# Patient Record
Sex: MALE | URBAN - METROPOLITAN AREA
[De-identification: names, ages, dates, MRNs, and addresses within clinical notes are randomized per-mention and may not be internally consistent; named-entity substitution may affect disease eponyms.]

---

## 2017-05-04 ENCOUNTER — TRANSFERRED RECORDS (OUTPATIENT)
Dept: HEALTH INFORMATION MANAGEMENT | Facility: CLINIC | Age: 67
End: 2017-05-04

## 2017-05-08 ENCOUNTER — TRANSFERRED RECORDS (OUTPATIENT)
Dept: HEALTH INFORMATION MANAGEMENT | Facility: CLINIC | Age: 67
End: 2017-05-08

## 2017-05-10 ENCOUNTER — TRANSFERRED RECORDS (OUTPATIENT)
Dept: HEALTH INFORMATION MANAGEMENT | Facility: CLINIC | Age: 67
End: 2017-05-10

## 2017-05-15 ENCOUNTER — TRANSFERRED RECORDS (OUTPATIENT)
Dept: HEALTH INFORMATION MANAGEMENT | Facility: CLINIC | Age: 67
End: 2017-05-15

## 2017-05-18 ENCOUNTER — TRANSFERRED RECORDS (OUTPATIENT)
Dept: HEALTH INFORMATION MANAGEMENT | Facility: CLINIC | Age: 67
End: 2017-05-18

## 2017-05-22 ENCOUNTER — MEDICAL CORRESPONDENCE (OUTPATIENT)
Dept: HEALTH INFORMATION MANAGEMENT | Facility: CLINIC | Age: 67
End: 2017-05-22

## 2017-06-05 ENCOUNTER — PRE VISIT (OUTPATIENT)
Dept: UROLOGY | Facility: CLINIC | Age: 67
End: 2017-06-05

## 2017-06-09 ENCOUNTER — OFFICE VISIT (OUTPATIENT)
Dept: UROLOGY | Facility: CLINIC | Age: 67
End: 2017-06-09

## 2017-06-09 ENCOUNTER — OFFICE VISIT (OUTPATIENT)
Dept: SURGERY | Facility: CLINIC | Age: 67
End: 2017-06-09

## 2017-06-09 ENCOUNTER — ALLIED HEALTH/NURSE VISIT (OUTPATIENT)
Dept: UROLOGY | Facility: CLINIC | Age: 67
End: 2017-06-09

## 2017-06-09 ENCOUNTER — ALLIED HEALTH/NURSE VISIT (OUTPATIENT)
Dept: SURGERY | Facility: CLINIC | Age: 67
End: 2017-06-09

## 2017-06-09 ENCOUNTER — ANESTHESIA EVENT (OUTPATIENT)
Dept: SURGERY | Facility: CLINIC | Age: 67
DRG: 658 | End: 2017-06-09
Payer: MEDICARE

## 2017-06-09 VITALS
BODY MASS INDEX: 23.35 KG/M2 | DIASTOLIC BLOOD PRESSURE: 90 MMHG | SYSTOLIC BLOOD PRESSURE: 161 MMHG | OXYGEN SATURATION: 98 % | HEART RATE: 74 BPM | RESPIRATION RATE: 16 BRPM | HEIGHT: 72 IN | WEIGHT: 172.4 LBS | TEMPERATURE: 97.4 F

## 2017-06-09 VITALS
HEIGHT: 71 IN | HEART RATE: 60 BPM | SYSTOLIC BLOOD PRESSURE: 165 MMHG | DIASTOLIC BLOOD PRESSURE: 87 MMHG | BODY MASS INDEX: 24.14 KG/M2 | WEIGHT: 172.4 LBS

## 2017-06-09 DIAGNOSIS — C64.1 MALIGNANT NEOPLASM OF RIGHT KIDNEY (H): Primary | ICD-10-CM

## 2017-06-09 DIAGNOSIS — Z01.818 PRE-OP EVALUATION: Primary | ICD-10-CM

## 2017-06-09 DIAGNOSIS — I10 ESSENTIAL HYPERTENSION: ICD-10-CM

## 2017-06-09 DIAGNOSIS — N28.89 RENAL MASS: ICD-10-CM

## 2017-06-09 PROBLEM — R31.0 GROSS HEMATURIA: Status: ACTIVE | Noted: 2017-05-03

## 2017-06-09 LAB
ALBUMIN SERPL-MCNC: 4.3 G/DL (ref 3.4–5)
ALBUMIN UR-MCNC: NEGATIVE MG/DL
ALP SERPL-CCNC: 74 U/L (ref 40–150)
ALT SERPL W P-5'-P-CCNC: 32 U/L (ref 0–70)
ANION GAP SERPL CALCULATED.3IONS-SCNC: 8 MMOL/L (ref 3–14)
APPEARANCE UR: CLEAR
AST SERPL W P-5'-P-CCNC: 21 U/L (ref 0–45)
BASOPHILS # BLD AUTO: 0.1 10E9/L (ref 0–0.2)
BASOPHILS NFR BLD AUTO: 1 %
BILIRUB SERPL-MCNC: 0.8 MG/DL (ref 0.2–1.3)
BILIRUB UR QL STRIP: NEGATIVE
BUN SERPL-MCNC: 23 MG/DL (ref 7–30)
CALCIUM SERPL-MCNC: 9.5 MG/DL (ref 8.5–10.1)
CHLORIDE SERPL-SCNC: 106 MMOL/L (ref 94–109)
CO2 SERPL-SCNC: 26 MMOL/L (ref 20–32)
COLOR UR AUTO: YELLOW
CREAT SERPL-MCNC: 0.98 MG/DL (ref 0.66–1.25)
DIFFERENTIAL METHOD BLD: NORMAL
EOSINOPHIL # BLD AUTO: 0 10E9/L (ref 0–0.7)
EOSINOPHIL NFR BLD AUTO: 0.3 %
ERYTHROCYTE [DISTWIDTH] IN BLOOD BY AUTOMATED COUNT: 12.6 % (ref 10–15)
GFR SERPL CREATININE-BSD FRML MDRD: 76 ML/MIN/1.7M2
GLUCOSE SERPL-MCNC: 118 MG/DL (ref 70–99)
GLUCOSE UR STRIP-MCNC: NEGATIVE MG/DL
HCT VFR BLD AUTO: 45.7 % (ref 40–53)
HGB BLD-MCNC: 15.9 G/DL (ref 13.3–17.7)
HGB UR QL STRIP: NEGATIVE
IMM GRANULOCYTES # BLD: 0 10E9/L (ref 0–0.4)
IMM GRANULOCYTES NFR BLD: 0.5 %
INR PPP: 1.04 (ref 0.86–1.14)
KETONES UR STRIP-MCNC: NEGATIVE MG/DL
LEUKOCYTE ESTERASE UR QL STRIP: NEGATIVE
LYMPHOCYTES # BLD AUTO: 0.9 10E9/L (ref 0.8–5.3)
LYMPHOCYTES NFR BLD AUTO: 15.1 %
MCH RBC QN AUTO: 31.5 PG (ref 26.5–33)
MCHC RBC AUTO-ENTMCNC: 34.8 G/DL (ref 31.5–36.5)
MCV RBC AUTO: 91 FL (ref 78–100)
MONOCYTES # BLD AUTO: 0.4 10E9/L (ref 0–1.3)
MONOCYTES NFR BLD AUTO: 7 %
MUCOUS THREADS #/AREA URNS LPF: PRESENT /LPF
NEUTROPHILS # BLD AUTO: 4.4 10E9/L (ref 1.6–8.3)
NEUTROPHILS NFR BLD AUTO: 76.1 %
NITRATE UR QL: NEGATIVE
NRBC # BLD AUTO: 0 10*3/UL
NRBC BLD AUTO-RTO: 0 /100
PH UR STRIP: 6 PH (ref 5–7)
PLATELET # BLD AUTO: 183 10E9/L (ref 150–450)
POTASSIUM SERPL-SCNC: 4.2 MMOL/L (ref 3.4–5.3)
PROT SERPL-MCNC: 7.3 G/DL (ref 6.8–8.8)
RBC # BLD AUTO: 5.05 10E12/L (ref 4.4–5.9)
RBC #/AREA URNS AUTO: 1 /HPF (ref 0–2)
SODIUM SERPL-SCNC: 140 MMOL/L (ref 133–144)
SP GR UR STRIP: 1.02 (ref 1–1.03)
URN SPEC COLLECT METH UR: ABNORMAL
UROBILINOGEN UR STRIP-MCNC: 0 MG/DL (ref 0–2)
WBC # BLD AUTO: 5.8 10E9/L (ref 4–11)
WBC #/AREA URNS AUTO: 1 /HPF (ref 0–2)

## 2017-06-09 RX ORDER — AMLODIPINE BESYLATE 10 MG/1
10 TABLET ORAL EVERY MORNING
COMMUNITY
Start: 2017-04-25

## 2017-06-09 RX ORDER — CEFAZOLIN SODIUM 1 G/3ML
1 INJECTION, POWDER, FOR SOLUTION INTRAMUSCULAR; INTRAVENOUS SEE ADMIN INSTRUCTIONS
Status: CANCELLED | OUTPATIENT
Start: 2017-06-09

## 2017-06-09 RX ORDER — GABAPENTIN 300 MG/1
300 CAPSULE ORAL ONCE
Status: CANCELLED | OUTPATIENT
Start: 2017-06-09 | End: 2017-06-09

## 2017-06-09 RX ORDER — ACETAMINOPHEN 325 MG/1
975 TABLET ORAL ONCE
Status: CANCELLED | OUTPATIENT
Start: 2017-06-09 | End: 2017-06-09

## 2017-06-09 RX ORDER — HEPARIN SODIUM 5000 [USP'U]/.5ML
5000 INJECTION, SOLUTION INTRAVENOUS; SUBCUTANEOUS
Status: CANCELLED | OUTPATIENT
Start: 2017-06-09

## 2017-06-09 ASSESSMENT — ENCOUNTER SYMPTOMS
DIFFICULTY URINATING: 0
POLYPHAGIA: 0
WEIGHT LOSS: 0
WEIGHT GAIN: 0
CHILLS: 0
NIGHT SWEATS: 1
DYSURIA: 0
FLANK PAIN: 0
FEVER: 0
INCREASED ENERGY: 0
HALLUCINATIONS: 0
HEMATURIA: 1
FATIGUE: 1
POLYDIPSIA: 0
DECREASED APPETITE: 0
ALTERED TEMPERATURE REGULATION: 0

## 2017-06-09 ASSESSMENT — LIFESTYLE VARIABLES: TOBACCO_USE: 1

## 2017-06-09 ASSESSMENT — PAIN SCALES - GENERAL: PAINLEVEL: NO PAIN (0)

## 2017-06-09 NOTE — MR AVS SNAPSHOT
After Visit Summary   2017    Davon Hui    MRN: [id number]           Patient Information     Date Of Birth          1950        Visit Information        Provider Department      2017 12:30 PM Rn, [facility]        Care Instructions    Preparing for Your Surgery      Name:  Davon Hui   MRN:  [id number]   :  1950   Today's Date:  2017     Arriving for surgery:  Surgery date:    Surgery time:  9:30AM  Arrival time:  7:30AM  Please come to:       [facility] [address]  [address] [place]  [address]    -   parking is available in front of the hospital from 5:15 am to 8:00 pm    -  Stop at the Information Desk in the lobby    -   Inform the information person that you are here for surgery. An escort to 3c will be provided. If you would not like an escort, please proceed to 3C on the 3rd floor. [phone]     What can I eat or drink?  -  You may have solid food or milk products until 8 hours prior to your surgery--do not eat food after midnight on the night before surgery   -  You may have water, apple juice or 7up/Sprite until 2 hours prior to your surgery--OK to have CLEAR liquids until 7:30AM on the morning of surgery     Which medicines can I take?  -  Do NOT take these medications in the morning, the day of surgery:      -  Please take these medications the day of surgery:    Amlodipine     How do I prepare myself?  -  Take two showers: one the night before surgery; and one the morning of surgery.         Use Scrubcare or Hibiclens to wash from neck down.  You may use your own shampoo and conditioner. No other hair products.   -  Do NOT use lotion, powder, deodorant, or antiperspirant the day of your surgery.  -  Do NOT wear any makeup, fingernail polish or jewelry.  -  Begin using Incentive Spirometer 1 week prior to surgery.  Use 4 times per day, up to 5-10 breaths each time.   Bring Incentive Spirometer to hospital.  -Do not bring your own medications to the hospital, except for inhalers and eye drops.  -  Bring your ID and insurance card.    Questions or Concerns:  If you have questions or concerns, please call the  Preoperative Assessment Center, Monday-Friday 7AM-7PM:  [phone]            AFTER YOUR SURGERY  Breathing exercises   Breathing exercises help you recover faster. Take deep breaths and let the air out slowly. This will:     Help you wake up after surgery.    Help prevent complications like pneumonia.  Preventing complications will help you go home sooner.   We may give you a breathing device (incentive spirometer) to encourage you to breathe deeply.   Nausea and vomiting   You may feel sick to your stomach after surgery; if so, let your nurse know.    Pain control:  After surgery, you may have pain. Our goal is to help you manage your pain. Pain medicine will help you feel comfortable enough to do activities that will help you heal.  These activities may include breathing exercises, walking and physical therapy.   To help your health care team treat your pain we will ask: 1) If you have pain  2) where it is located 3) describe your pain in your words  Methods of pain control include medications given by mouth, vein or by nerve block for some surgeries.  We may give you a pain control pump that will:  1) Deliver the medicine through a tube placed in your vein  2) Control the amount of medicine you receive  3) Allow you to push a button to deliver a dose of pain medicine  Sequential Compression Device (SCD) or Pneumo Boots:  You may need to wear SCD S on your legs or feet. These are wraps connected to a machine that pumps in air and releases it. The repeated pumping helps prevent blood clots from forming.    Using an Incentive Spirometer  Soon after your surgery, a nurse or therapist will teach you breathing exercises. These keep your lungs clear, strengthen your breathing  muscles, and help prevent complications.  The exercises include doing a deep-breathing exercise using a device called an incentive spirometer.  To do these exercises, you will breathe in through your mouth and not your nose. The incentive spirometer only works correctly if you breathe in through your mouth.  Four steps to clear lungs     Deep breathing expands the lungs, aids circulation, and helps prevent pneumonia.   1. Exhale normally.    Relax and breathe out.  2. Place your lips tightly around the mouthpiece.    Make sure the device is upright and not tilted.  3. Inhale as much air as you can through the mouthpiece (don't breath through your nose).    Inhale slowly and deeply.    Hold your breath long enough to keep the balls or disk raised for at least 3 seconds.    If you re inhaling too quickly, your device may make a tone. If you hear this tone, inhale more slowly.  4. Repeat the exercise regularly.    Do this exercise every hour while you're awake, or as your health care provider instructs.    You will also be taught coughing exercises and be asked to do them regularly on your own.    1003-9331 [employer]. [address] [place] [address]. All rights reserved. This information is not intended as a substitute for professional medical care. Always follow your healthcare professional's instructions.                Follow-ups after your visit        Your next 10 appointments already scheduled     Jun 09, 2017 12:30 PM CDT   (Arrive by 12:15 PM)   PAC RN ASSESSMENT with Courtney Pac Rn   [facility] Preoperative Assessment [place] ([facility])    [address] [place] [address] [phone]            Jun 09, 2017  1:10 PM CDT   (Arrive by 12:55 PM)   PAC Anesthesia Consult with Courtney Pac Anesthesiologist   [facility] Preoperative Assessment [place] ([facility])    [address] [place] [address]    [phone]            Jun 16, 2017  1:45 PM CDT   (Arrive by 1:30 PM)   MR ABDOMEN W/O & W CONTRAST ANGIOGRAM with UCMR1   [facility] [place] [facility] ([facility] [place])    [address] [id number] [phone]           Take your medicines as usual, unless your doctor tells you not to. Bring a list of your current medicines to your exam (including vitamins, minerals and over-the-counter drugs). Also bring the results of similar scans you may have had.    The day before your exam, drink extra fluids at least six 8-ounce glasses (unless your doctor tells you to restrict your fluids).   Have a blood test (creatinine test) within 30 days of your exam. Go to your clinic or Diagnostic Imaging Department for this test.   Do not eat or drink for 6 hours prior to exam.  The MRI machine uses a strong magnet. Please wear clothes without metal (snaps, zippers). A sweatsuit works well, or we may give you a hospital gown.  Please remove any body piercings and hair extensions before you arrive. You will also remove watches, jewelry, hairpins, wallets, dentures, partial dental plates and hearing aids. You may wear contact lenses, and you may be able to wear your rings. We have a safe place to keep your personal items, but it is safer to leave them at home.   **IMPORTANT** THE INSTRUCTIONS BELOW ARE ONLY FOR THOSE PATIENTS WHO HAVE BEEN TOLD THEY WILL RECEIVE SEDATION OR GENERAL ANESTHESIA DURING THEIR MRI PROCEDURE:  IF YOU WILL RECEIVE SEDATION (take medicine to help you relax during your exam):   You must get the medicine from your doctor before you arrive. Bring the medicine to the exam. Do not take it at home.   Arrive one hour early. Bring someone who can take you home after the test. Your medicine will make you sleepy. After the exam, you may not drive, take a bus or take a taxi by yourself.   No eating 8 hours before your exam. You may have clear liquids up until 4  hours before your exam. (Clear liquids include water, clear tea, black coffee and fruit juice without pulp.)  IF YOU WILL RECEIVE ANESTHESIA (be asleep for your exam):   Arrive 1 1/2 hours early. Bring someone who can take you home after the test. You may not drive, take a bus or take a taxi by yourself.   No eating 8 hours before your exam. You may have clear liquids up until 4 hours before your exam. (Clear liquids include water, clear tea, black coffee and fruit juice without pulp.)  If you have any questions, please contact your Imaging Department exam site.            Jun 21, 2017   Procedure with Davon Burger MD   [facility] [place], Same Day Surgery (--)    [address] [facility] [address] [phone]            Jul 07, 2017 11:00 AM CDT   (Arrive by 10:45 AM)   Post-Op with Davon Burger MD   [facility] and [facility] for Prostate and Urologic Cancers ([facility])    [address] [facility]  [address] [facility] [address] [phone]              Future tests that were ordered for you today     Open Future Orders        Priority Expected Expires Ordered    MRA Angiogram Abdomen w & wo Contrast Routine  7/14/2018 6/9/2017            Who to contact     Please call your clinic at [phone] to:    Ask questions about your health    Make or cancel appointments    Discuss your medicines    Learn about your test results    Speak to your doctor   If you have compliments or concerns about an experience at your clinic, or if you wish to file a complaint, please contact [facility] Patient Relations at [phone] or email us at [email or web address]         Additional Information About Your Visit        [employer] Information     [employer] gives you secure access to your electronic health record. If you see a primary care provider, you can also send messages to your care team and make appointments. If you have questions, please call your  primary care clinic.  If you do not have a primary care provider, please call [phone] and they will assist you.      "[employer] is an electronic gateway that provides easy, online access to your medical records. With "[employer] you can request a clinic appointment, read your test results, renew a prescription or communicate with your care team.     To access your existing account, please contact your [facility] or call [phone] for assistance.        Care EveryWhere ID     This is your Care EveryWhere ID. This could be used by other organizations to access your [place] medical records  [id number]         Blood Pressure from Last 3 Encounters:   06/09/17 161/90   06/09/17 165/87    Weight from Last 3 Encounters:   06/09/17 78.2 kg (172 lb 6.4 oz)   06/09/17 78.2 kg (172 lb 6.4 oz)              Today, you had the following     No orders found for display       Primary Care Provider Office Phone # Fax #    Magali Douglas [phone]       [address]  [facility] [address]        Thank you!     Thank you for choosing [facility] PREOPERATIVE ASSESSMENT [place]  for your care. Our goal is always to provide you with excellent care. Hearing back from our patients is one way we can continue to improve our services. Please take a few minutes to complete the written survey that you may receive in the mail after your visit with us. Thank you!             Your Updated Medication List - Protect others around you: Learn how to safely use, store and throw away your medicines at www.disposemymeds.org.          This list is accurate as of: 6/9/17 12:12 PM.  Always use your most recent med list.                   Brand Name Dispense Instructions for use    amLODIPine 10 MG tablet    NORVASC     Take 10 mg by mouth every morning

## 2017-06-09 NOTE — PATIENT INSTRUCTIONS
Preparing for Your Surgery      Name:  Davon Hui   MRN:  [id number]   :  1950   Today's Date:  2017     Arriving for surgery:  Surgery date:    Surgery time:  9:30AM  Arrival time:  7:30AM  Please come to:       [facility] [address]  [address] [place]  [address]    -   parking is available in front of the hospital from 5:15 am to 8:00 pm    -  Stop at the Information Desk in the lobby    -   Inform the information person that you are here for surgery. An escort to 3c will be provided. If you would not like an escort, please proceed to 3C on the 3rd floor. [phone]     What can I eat or drink?  -  You may have solid food or milk products until 8 hours prior to your surgery--do not eat food after midnight on the night before surgery   -  You may have water, apple juice or 7up/Sprite until 2 hours prior to your surgery--OK to have CLEAR liquids until 7:30AM on the morning of surgery     Which medicines can I take?  -  Do NOT take these medications in the morning, the day of surgery:      -  Please take these medications the day of surgery:    Amlodipine     How do I prepare myself?  -  Take two showers: one the night before surgery; and one the morning of surgery.         Use Scrubcare or Hibiclens to wash from neck down.  You may use your own shampoo and conditioner. No other hair products.   -  Do NOT use lotion, powder, deodorant, or antiperspirant the day of your surgery.  -  Do NOT wear any makeup, fingernail polish or jewelry.  -  Begin using Incentive Spirometer 1 week prior to surgery.  Use 4 times per day, up to 5-10 breaths each time.  Bring Incentive Spirometer to hospital.  -Do not bring your own medications to the hospital, except for inhalers and eye drops.  -  Bring your ID and insurance card.    Questions or Concerns:  If you have questions or concerns, please call the  Preoperative Assessment Center, Monday-Friday 7AM-7PM:   [phone]            AFTER YOUR SURGERY  Breathing exercises   Breathing exercises help you recover faster. Take deep breaths and let the air out slowly. This will:     Help you wake up after surgery.    Help prevent complications like pneumonia.  Preventing complications will help you go home sooner.   We may give you a breathing device (incentive spirometer) to encourage you to breathe deeply.   Nausea and vomiting   You may feel sick to your stomach after surgery; if so, let your nurse know.    Pain control:  After surgery, you may have pain. Our goal is to help you manage your pain. Pain medicine will help you feel comfortable enough to do activities that will help you heal.  These activities may include breathing exercises, walking and physical therapy.   To help your health care team treat your pain we will ask: 1) If you have pain  2) where it is located 3) describe your pain in your words  Methods of pain control include medications given by mouth, vein or by nerve block for some surgeries.  We may give you a pain control pump that will:  1) Deliver the medicine through a tube placed in your vein  2) Control the amount of medicine you receive  3) Allow you to push a button to deliver a dose of pain medicine  Sequential Compression Device (SCD) or Pneumo Boots:  You may need to wear SCD S on your legs or feet. These are wraps connected to a machine that pumps in air and releases it. The repeated pumping helps prevent blood clots from forming.    Using an Incentive Spirometer  Soon after your surgery, a nurse or therapist will teach you breathing exercises. These keep your lungs clear, strengthen your breathing muscles, and help prevent complications.  The exercises include doing a deep-breathing exercise using a device called an incentive spirometer.  To do these exercises, you will breathe in through your mouth and not your nose. The incentive spirometer only works correctly if you breathe in through your  mouth.  Four steps to clear lungs     Deep breathing expands the lungs, aids circulation, and helps prevent pneumonia.   1. Exhale normally.    Relax and breathe out.  2. Place your lips tightly around the mouthpiece.    Make sure the device is upright and not tilted.  3. Inhale as much air as you can through the mouthpiece (don't breath through your nose).    Inhale slowly and deeply.    Hold your breath long enough to keep the balls or disk raised for at least 3 seconds.    If you re inhaling too quickly, your device may make a tone. If you hear this tone, inhale more slowly.  4. Repeat the exercise regularly.    Do this exercise every hour while you're awake, or as your health care provider instructs.    You will also be taught coughing exercises and be asked to do them regularly on your own.    9781-9063 [employer]. [address] [place] [address], [place] [address]. All rights reserved. This information is not intended as a substitute for professional medical care. Always follow your healthcare professional's instructions.

## 2017-06-09 NOTE — MR AVS SNAPSHOT
After Visit Summary   6/9/2017    Davon Hui    MRN: [id number]           Patient Information     Date Of Birth          1950        Visit Information        Provider Department      6/9/2017 9:00 AM Davon Burger MD [facility]        Today's Diagnoses     Malignant neoplasm of right kidney (H)    -  1      Care Instructions    Blood work today.    Schedule appointment for MRI next Friday.  Dr. Burger will notify you of the results.    Schedule surgery with Dr. Burger.    It was a pleasure meeting with you today.  Thank you for allowing me and my team the privilege of caring for you today.  YOU are the reason we are here, and I truly hope we provided you with the excellent service you deserve.  Please let us know if there is anything else we can do for you so that we can be sure you are leaving completely satisfied with your care experience.      Margarita Godoy IVORY          Follow-ups after your visit        Additional Services     PAC Visit Referral (For [facility] Only)       Does this visit require an Anesthesia consult?  Yes.  Prolonged surgery.                  Your next 10 appointments already scheduled     Jun 09, 2017 11:00 AM CDT   LAB with [facility] ([facility])    [address] [place] [id number] [phone]           Patient must bring picture ID.  Patient should be prepared to give a urine specimen  Please do not eat 10-12 hours before your appointment if you are coming in fasting for labs on lipids, cholesterol, or glucose (sugar).  Pregnant women should follow their Care Team instructions. Water with medications is okay. Do not drink coffee or other fluids.   If you have concerns about taking  your medications, please ask at office or if scheduling via [employer], send a message by clicking on Secure Messaging, Message Your Care Team.            Jun 16, 2017   1:45 PM CDT   (Arrive by 1:30 PM)   MR ABDOMEN W/O & W CONTRAST ANGIOGRAM with UCMR1   [facility] ([facility])    [address] [address] [phone]           Take your medicines as usual, unless your doctor tells you not to. Bring a list of your current medicines to your exam (including vitamins, minerals and over-the-counter drugs). Also bring the results of similar scans you may have had.    The day before your exam, drink extra fluids at least six 8-ounce glasses (unless your doctor tells you to restrict your fluids).   Have a blood test (creatinine test) within 30 days of your exam. Go to your clinic or Diagnostic Imaging Department for this test.   Do not eat or drink for 6 hours prior to exam.  The MRI machine uses a strong magnet. Please wear clothes without metal (snaps, zippers). A sweatsuit works well, or we may give you a hospital gown.  Please remove any body piercings and hair extensions before you arrive. You will also remove watches, jewelry, hairpins, wallets, dentures, partial dental plates and hearing aids. You may wear contact lenses, and you may be able to wear your rings. We have a safe place to keep your personal items, but it is safer to leave them at home.   **IMPORTANT** THE INSTRUCTIONS BELOW ARE ONLY FOR THOSE PATIENTS WHO HAVE BEEN TOLD THEY WILL RECEIVE SEDATION OR GENERAL ANESTHESIA DURING THEIR MRI PROCEDURE:  IF YOU WILL RECEIVE SEDATION (take medicine to help you relax during your exam):   You must get the medicine from your doctor before you arrive. Bring the medicine to the exam. Do not take it at home.   Arrive one hour early. Bring someone who can take you home after the test. Your medicine will make you sleepy. After the exam, you may not drive, take a bus or take a taxi by yourself.   No eating 8 hours before your exam. You may have clear liquids up until 4 hours before your exam. (Clear liquids  include water, clear tea, black coffee and fruit juice without pulp.)  IF YOU WILL RECEIVE ANESTHESIA (be asleep for your exam):   Arrive 1 1/2 hours early. Bring someone who can take you home after the test. You may not drive, take a bus or take a taxi by yourself.   No eating 8 hours before your exam. You may have clear liquids up until 4 hours before your exam. (Clear liquids include water, clear tea, black coffee and fruit juice without pulp.)  If you have any questions, please contact your Imaging Department exam site.              Future tests that were ordered for you today     Open Future Orders        Priority Expected Expires Ordered    MRA Angiogram Abdomen w & wo Contrast Routine  7/14/2018 6/9/2017            Who to contact     Please call your clinic at [phone] to:    Ask questions about your health    Make or cancel appointments    Discuss your medicines    Learn about your test results    Speak to your doctor   If you have compliments or concerns about an experience at your clinic, or if you wish to file a complaint, please contact [facility] Patient Relations at [phone] or email us at [email or web address]         Additional Information About Your Visit        [employer] Information     [employer] gives you secure access to your electronic health record. If you see a primary care provider, you can also send messages to your care team and make appointments. If you have questions, please call your primary care clinic.  If you do not have a primary care provider, please call [phone] and they will assist you.      [employer] is an electronic gateway that provides easy, online access to your medical records. With [employer], you can request a clinic appointment, read your test results, renew a prescription or communicate with your care team.     To access your existing account, please contact your [facility] or call [phone] for  "assistance.        Care EveryWhere ID     This is your Care EveryWhere ID. This could be used by other organizations to access your [place] medical records  [id number]        Your Vitals Were     Pulse Height BMI (Body Mass Index)             60 1.803 m (5' 11\") 24.04 kg/m2          Blood Pressure from Last 3 Encounters:   06/09/17 165/87    Weight from Last 3 Encounters:   06/09/17 78.2 kg (172 lb 6.4 oz)              We Performed the Following     CBC with platelets differential     Comprehensive metabolic panel     Cytology non gyn     INR     PAC Visit Referral (For [facility] Only)     Radha-Operative Worksheet  (Urology General)     UA with Microscopic     Urine Culture Aerobic Bacterial        Primary Care Provider Office Phone # Fax #    Magali Douglas [phone]       [facility] [address]  [facility] [address]        Thank you!     Thank you for choosing [facility] AND [facility] FOR PROSTATE AND UROLOGIC CANCERS  for your care. Our goal is always to provide you with excellent care. Hearing back from our patients is one way we can continue to improve our services. Please take a few minutes to complete the written survey that you may receive in the mail after your visit with us. Thank you!             Your Updated Medication List - Protect others around you: Learn how to safely use, store and throw away your medicines at www.disposemymeds.org.          This list is accurate as of: 6/9/17 10:47 AM.  Always use your most recent med list.                   Brand Name Dispense Instructions for use    amLODIPine 10 MG tablet    NORVASC           "

## 2017-06-09 NOTE — PROGRESS NOTES
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    During counseling for this visit, we covered the risk of renal cell carcinoma and how this renal mass may be a non-cancerous lesion.  We covered options including observation, cryoablation, partial nephrectomy, and nephrectomy.  We covered different surgical approaches such as percutaneous, laparoscopic, robotic, and open surgery.  We covered the risk of observation which is metastatic spread and need for continued observation.  We covered surgical risks which include but are not limited to heart attack, stroke, blood clot in the legs or lungs, death, injury to surrounding organs (intestine, liver, spleen, pancreas, lung, muscles, nerves), hernias, loss of sensation around incisions, decreased renal function, and infection.  We discussed how blood transfusion may be necessary and the risks are viral illnesses such as HIV(AIDS) and Hepatitis.  Additional procedures may be necessary in the perioperative period.  If minimally invasive techniques are used it may be necessary to convert to open surgery, and if partial nephrectomy is attempted than full nephrectomy may be required.    I spoke with him today about our plan.  At this point, I think we should plan for a right nephrectomy.  It is unclear whether we will be able to do this laparoscopically or open as I am not sure whether we will be able to get enough exposure on the renal vein with the thrombus.  I will plan for an MRI immediately before his surgery and 12 in days.  This is to rule out any growth of the renal vein thrombus into the vena cava.  I will have a vascular surgeon available in case IVC thrombectomy is necessary.  Additionally, I will work with our radiologist and other doctors to determine whether he needs any further evaluation of the liver mass.  His biopsy showed hemangioma, but his PET scan showed a significant uptake suspicious for renal cell carcinoma.  I am not sure how to  rectify these 2 findings and will seek additional help in this.    Addendum:  After his follow-up MRI, our radiologists feel the liver lesion is consistent with hemangioma. This is in agreement with the biopsy of the liver mass.  _______________________________________________________________________    CHIEF COMPLAINT  It was my pleasure to see Davon Hui who is a 67 year old male for evaluation of renal mass    HPI   Mr. Hui is here today along with his wife for evaluation of a right renal mass with renal vein thrombus.  His symptoms began in late April or early May of this year when he developed gross hematuria.  This started when he was moving items throughout his storage shed and then recurred when he was in [place].  He has undergone evaluation by Dr. Marciano Corbett at [facility] and is, in fact, referred here by him.  Dr. Corbett did do a cystoscopy as well as a CT scan and a PET scan. I reviewed these scans with him today. He also saw Dr. Shelby Gordon at [facility] in Oncology for this problem as well. On his PET scan there was a question of a liver metastasis.  Biopsy of this was done at [facility] which showed a hemangioma. He did see a physician in [place], who did a digital rectal exam.  This was apparently negative.  Dr. Corbett apparently did a cystoscopy that was negative as well.      He denies any pain, previous history of renal mass or kidney problems.  He denies any family history of kidney issues.  He does note a 5 pound weight loss, but has a good appetite and no other complaints.     Shelby Gordon is his Oncologist at [facility].    Presenting symptom: Hematuria  Hereditary syndromes: None  ECOG Performance Score: 0  0=Fully active, 1=Unable to do strenuous activity but capable of light work, 2=Ambulatory and capable of all selfcare, 3=Capable of limited selfcare, confined to bed >50% of waking hours, 4=Completely disabled.      Patient Active Problem List     Diagnosis Date Noted     Essential hypertension 05/03/2017     Priority: Medium     Gross hematuria 05/03/2017     Priority: Medium     Past Medical History:   Diagnosis Date     HTN (hypertension)      Liver hemangioma      Renal mass, right      No past surgical history on file.  Current Outpatient Prescriptions   Medication Sig Dispense Refill     amLODIPine (NORVASC) 10 MG tablet Take 10 mg by mouth every morning         No Known Allergies  Family History   Problem Relation Age of Onset     Abdominal Aortic Aneurysm Mother 93     Gallbladder problem      Social History     Occupational History     Not on file.     Social History Main Topics     Smoking status: Former Smoker     Packs/day: 0.50     Years: 3.00     Types: Cigarettes     Smokeless tobacco: Former User     Quit date: 6/9/1972     Alcohol use 12.6 oz/week     21 Glasses of wine per week      Comment: 3 glasses of wine per day     Drug use: No     Sexual activity: Not on file       REVIEW OF SYSTEMS  Answers for HPI/ROS submitted by the patient on 6/9/2017   General Symptoms: Yes  Skin Symptoms: No  HENT Symptoms: No  EYE SYMPTOMS: No  HEART SYMPTOMS: No  LUNG SYMPTOMS: No  INTESTINAL SYMPTOMS: No  URINARY SYMPTOMS: Yes  REPRODUCTIVE SYMPTOMS: No  SKELETAL SYMPTOMS: No  BLOOD SYMPTOMS: No  NERVOUS SYSTEM SYMPTOMS: No  MENTAL HEALTH SYMPTOMS: No  Fever: No  Loss of appetite: No  Weight loss: No  Weight gain: No  Fatigue: Yes  Night sweats: Yes  Increased stress: Yes  Excessive hunger: No  Excessive thirst: No  Feeling hot or cold when others believe the temperature is normal: No  Loss of height: No  Post-operative complications: No  Surgical site pain: No  Hallucinations: No  Change in or Loss of Energy: No  Hyperactivity: No  Confusion: No  Trouble holding urine or incontinence: No  Pain or burning: No  Trouble starting or stopping: No  Increased frequency of urination: No  Blood in urine: Yes  Decreased frequency of urination: No  Frequent nighttime  "urination: Yes  Flank pain: No  Difficulty emptying bladder: No      PHYSICAL EXAM  Vitals:    06/09/17 0906   BP: 165/87   Pulse: 60   Weight: 78.2 kg (172 lb 6.4 oz)   Height: 1.803 m (5' 11\")     Wt Readings from Last 3 Encounters:   06/09/17 78.2 kg (172 lb 6.4 oz)   06/09/17 78.2 kg (172 lb 6.4 oz)     Constitutional: Alert, no acute distress  Psychiatric: Normal mood and affect  Head: Normocephalic.  Neck: Neck supple. No adenopathy. Thyroid symmetric, normal size  ENT: Oropharynx clear.  Back: No spinal tenderness.  No costovertebral angle tenderness  Cardiovascular: RRR. No murmurs, clicks gallops or rub  Respiratory: Good diaphragmatic excursion. Lungs clear  Gastrointestinal: Abdomen soft, non-tender. No masses, organomegaly. No hernia  Skin: no suspicious lesions or rashes on abdomen  Extremities: No lower extremity edema.  No clubbing or cyanosis.  Neurologic:  Cranial nerves grossly intact.  Equal strength and sensation on bilateral extremities.   : Deferred     Recent Labs   Lab Test  06/09/17   1125   WBC  5.8   HGB  15.9   HCT  45.7   PLT  183     Recent Labs   Lab Test  06/09/17   1125   NA  140   POTASSIUM  4.2   CHLORIDE  106   CO2  26   ANIONGAP  8   GLC  118*   BUN  23   CR  0.98   GFRESTIMATED  76   GFRESTBLACK  >90   GFR Calc     YARITZA  9.5     Recent Labs   Lab Test  06/09/17   1131   COLOR  Yellow   APPEARANCE  Clear   URINEGLC  Negative   URINEBILI  Negative   URINEKETONE  Negative   SG  1.019   UBLD  Negative   URINEPH  6.0   PROTEIN  Negative   NITRITE  Negative   LEUKEST  Negative   RBCU  1   WBCU  1       Aleksandr TOVAR, reviewed these laboratory values.      CC  Patient Care Team:  Magali Douglas NP as PCP - General (Family Practice)  Shelby Gordon as Referring Physician (Hematology & Oncology)  Davon Burger MD as MD (Urology)  Eileen Michel, EDA as Registered Nurse (Urology)  Magali Douglas NP as Referring Physician (Family " Practice)  YONATHAN MEHTA    Copy to patient  LYRIC DANG  [address] [facility] [address]

## 2017-06-09 NOTE — H&P
Pre-Operative H & P     CC:  Preoperative exam to assess for increased cardiopulmonary risk while undergoing surgery and anesthesia.    Date of Encounter: 6/9/2017  Primary Care Physician:  Magali Douglas  Davon Hui is a 67 year old male who presents for pre-operative H & P in preparation for right nephrectomy and possible surgery on liver with Dr. Burger on 6/21/17 at [facility]. Mr. Hui is healthy and active.  He was in [place] and noticed blood in his urine.  Initially it was a lot.  He was using aspirin prn at the time.  The hematuria stopped.  He was started on Norvasc for hypertension 1 month ago when he went in for the hematuria.  On 5/4/17, he had a CT that demonstrated right renal mass.  Incidentally found liver mass 4cm.  PET scan was concerning for possible malignancy.  Liver biopsy 5/18/17 did not show malignancy (hemangioma).  He saw Dr. Burger today.  His notes are pending.  MRI of liver is ordered to evaluate the liver to confirm absence of malignancy.      History is obtained from the patient and electronic health record.     Past Medical History  Past Medical History:   Diagnosis Date     HTN (hypertension)      Liver hemangioma      Renal mass, right        Past Surgical History  History reviewed. No pertinent surgical history.    Hx of Blood transfusions/reactions: no     Hx of abnormal bleeding or anti-platelet use: no    Steroid use in the last year: no    Personal or FH with difficulty with Anesthesia:  no    Prior to Admission Medications  Current Outpatient Prescriptions   Medication Sig Dispense Refill     amLODIPine (NORVASC) 10 MG tablet Take 10 mg by mouth every morning          Allergies  No Known Allergies    Social History  Social History     Social History     Marital status:      Spouse name: N/A     Number of children: N/A     Years of education: N/A     Occupational History     Not on file.     Social  "History Main Topics     Smoking status: Former Smoker     Packs/day: 0.50     Years: 3.00     Types: Cigarettes     Smokeless tobacco: Former User     Quit date: 6/9/1972     Alcohol use 12.6 oz/week     21 Glasses of wine per week      Comment: 3 glasses of wine per day     Drug use: No     Sexual activity: Not on file     Other Topics Concern     Not on file     Social History Narrative    4 brothers and 2 sisters.      Sister had breast cancer    2 bro with asthma    1 brother with cancer    No children.  .     Works in entertainment business       Family History  Family History   Problem Relation Age of Onset     Abdominal Aortic Aneurysm Mother 93     Gallbladder problem         ROS/MED HX  The complete review of systems is negative other than noted in the HPI or here.     ENT/Pulmonary:     (+)tobacco use, Past use in highschool only   Neurologic:       Cardiovascular:     (+) hypertension     METS/Exercise Tolerance:   > 4.  Can do 1 hour of cardio   Hematologic:    Neg     Musculoskeletal:    Low back pain     GI/Hepatic:    Neg.  No GERD     Renal/Genitourinary:    See HPI     Endo:    Neg.  No diabetes mellitus or thyroid problems.     Psychiatric:    Neg.     Infectious Disease:    Neg.  Feeling well.             Other:             Temp: 97.4  F (36.3  C) Temp src: Oral BP: 161/90 Pulse: 74   Resp: 16 SpO2: 98 %         172 lbs 6.4 oz  6' 0\"   Body mass index is 23.38 kg/(m^2).       Physical Exam  Constitutional: Awake, alert, cooperative, no apparent distress, and appears stated age.  Eyes: Pupils equal, round and reactive to light,  sclera clear, conjunctiva normal.  HENT: Normocephalic, oral pharynx with moist mucus membranes, good dentition. No goiter appreciated.   Respiratory: Clear to auscultation bilaterally, no crackles or wheezing.  Cardiovascular: Regular rate and rhythm, normal S1 and S2, and no murmur noted.  Carotids +2, no bruits. No edema. Palpable pulses to DP and PT arteries. "   GI: Normal bowel sounds, soft, non-distended, non-tender, no masses palpated, no hepatosplenomegaly.    Lymph/Hematologic: No cervical lymphadenopathy and no supraclavicular lymphadenopathy.  Genitourinary:  Per urology  Skin: Warm and dry.  No rashes at anticipated surgical site.   Musculoskeletal: Full ROM of neck. There is no redness, warmth, or swelling of the joints. Gross motor strength is normal.    Neurologic: Awake, alert, oriented to name, place and time. Cranial nerves II-XII are grossly intact. Gait is normal.   Neuropsychiatric: Calm, cooperative. Normal affect.     Labs: (personally reviewed)    6/9/2017 11:25  Sodium: 140  Potassium: 4.2  Chloride: 106  Carbon Dioxide: 26  Urea Nitrogen: 23  Creatinine: 0.98  GFR Estimate: 76  GFR Estimate If Black: >90...  Calcium: 9.5  Anion Gap: 8  Albumin: 4.3  Protein Total: 7.3  Bilirubin Total: 0.8  Alkaline Phosphatase: 74  ALT: 32  AST: 21  Glucose: 118 (H)    WBC: 5.8  Hemoglobin: 15.9  Hematocrit: 45.7  Platelet Count: 183  RBC Count: 5.05  MCV: 91  MCH: 31.5  MCHC: 34.8  RDW: 12.6  Diff Method: Automated Method  % Neutrophils: 76.1  % Lymphocytes: 15.1  % Monocytes: 7.0  % Eosinophils: 0.3  % Basophils: 1.0  % Immature Granulocytes: 0.5  Nucleated RBCs: 0  Absolute Neutrophil: 4.4  Absolute Lymphocytes: 0.9  Absolute Monocytes: 0.4  Absolute Eosinophils: 0.0  Absolute Basophils: 0.1  Abs Immature Granulocytes: 0.0  Absolute Nucleated RBC: 0.0  INR: 1.04      6/9/2017 11:30    6/9/2017 11:31  Color Urine: Yellow  Appearance Urine: Clear  Glucose Urine: Negative  Bilirubin Urine: Negative  Ketones Urine: Negative  Specific Gravity Urine: 1.019  pH Urine: 6.0  Protein Albumin Urine: Negative  Urobilinogen mg/dL: 0.0  Nitrite Urine: Negative  Blood Urine: Negative  Leukocyte Esterase Urine: Negative  Source: Midstream Urine  WBC Urine: 1  RBC Urine: 1  Mucous Urine: Present (A)    Other findings:   CBC 5/3/17  White Blood Cell Count   Red Blood Cell  Count 4.97   Hemoglobin 15.8   Hematocrit 45.3   Mean Corpuscular Volume 91.1   RDW 12.7   Platelet Count 211     Creatinine Serum 1.07   Est GFR Non-Afr Am >60  Sodium 141   Potassium 5.3 (H)   Chloride 105   CO2  26    EKG: Personally reviewed but formal cardiology read pending: SB at 56 bpm.  QTc 413    CT abdomen:  Large enhancing mass in the right kidney with extension into the right renal vein consistent with a renal cell carcinoma.     Outside records reviewed from: [facility]    ASSESSMENT and PLAN  Davon Hui is a 67 year old male scheduled to undergo right nephrectomy and ? Liver surgery with Dr. Burger on 6/21/17 at [facility].    Pre-operative considerations:  1.  Cardiac:  Functional status excellent.  Can do 1 hour of cardio. 0.9%  risk of major adverse cardiac event.  Risk of MACE < 1%. METS>4.  No further cardiac evaluation needed per 2014 ACC/AHA guidelines for non-cardiac surgery.  2.  Pulm:  Airway feasible.  ERENDIRA risk:  High.  Recommended OP sleep study.  Non-smoker.   3.  GI:  Risk of PONV score = 1.  If > 2, anti-emetic intervention recommended.    4.  :  Hematuria resolved.      T & S ordered.   Patient is optimized and is acceptable candidate for the proposed procedure.  No further diagnostic evaluation is needed.     Jessica Leyva PA-C      Patient was discussed with Dr Jung.    Jessica Leyva PA-C  Preoperative Assessment Center  [facility]  Clinic and Surgery Center  Phone: [phone]  Fax: [phone]

## 2017-06-09 NOTE — LETTER
6/9/2017       RE: Davon Hui  [address] [facility] [address]     Dear Colleague,    Thank you for referring your patient, Davon Hui, to the [facility] UROLOGY AND [facility] FOR PROSTATE AND UROLOGIC CANCERS at [facility] acute hospital. Please see a copy of my visit note below.    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    During counseling for this visit, we covered the risk of renal cell carcinoma and how this renal mass may be a non-cancerous lesion.  We covered options including observation, cryoablation, partial nephrectomy, and nephrectomy.  We covered different surgical approaches such as percutaneous, laparoscopic, robotic, and open surgery.  We covered the risk of observation which is metastatic spread and need for continued observation.  We covered surgical risks which include but are not limited to heart attack, stroke, blood clot in the legs or lungs, death, injury to surrounding organs (intestine, liver, spleen, pancreas, lung, muscles, nerves), hernias, loss of sensation around incisions, decreased renal function, and infection.  We discussed how blood transfusion may be necessary and the risks are viral illnesses such as HIV(AIDS) and Hepatitis.  Additional procedures may be necessary in the perioperative period.  If minimally invasive techniques are used it may be necessary to convert to open surgery, and if partial nephrectomy is attempted than full nephrectomy may be required.    I spoke with him today about our plan.  At this point, I think we should plan for a right nephrectomy.  It is unclear whether we will be able to do this laparoscopically or open as I am not sure whether we will be able to get enough exposure on the renal vein with the thrombus.  I will plan for an MRI immediately before his surgery and 12 in days.  This is to rule out any growth of the renal vein thrombus into the vena cava.  I will have a vascular surgeon  available in case IVC thrombectomy is necessary.  Additionally, I will work with our radiologist and other doctors to determine whether he needs any further evaluation of the liver mass.  His biopsy showed hemangioma, but his PET scan showed a significant uptake suspicious for renal cell carcinoma.  I am not sure how to rectify these 2 findings and will seek additional help in this.    Addendum:  After his follow-up MRI, our radiologists feel the liver lesion is consistent with hemangioma. This is in agreement with the biopsy of the liver mass.  _______________________________________________________________________    CHIEF COMPLAINT  It was my pleasure to see Davon Hui who is a 67 year old male for evaluation of renal mass    HPI   Mr. Hui is here today along with his wife for evaluation of a right renal mass with renal vein thrombus.  His symptoms began in late April or early May of this year when he developed gross hematuria.  This started when he was moving items throughout his storage shed and then recurred when he was in [place].  He has undergone evaluation by Dr. Marciano Corbett at [facility] and is, in fact, referred here by him.  Dr. Corbtet did do a cystoscopy as well as a CT scan and a PET scan. I reviewed these scans with him today. He also saw Dr. Shelby Gordon at [facility] in Oncology for this problem as well. On his PET scan there was a question of a liver metastasis.  Biopsy of this was done at [facility] which showed a hemangioma. He did see a physician in [place], who did a digital rectal exam.  This was apparently negative.  Dr. Corbett apparently did a cystoscopy that was negative as well.      He denies any pain, previous history of renal mass or kidney problems.  He denies any family history of kidney issues.  He does note a 5 pound weight loss, but has a good appetite and no other complaints.     Shelby Gordon is his Oncologist at [facility].    Presenting  "symptom: Hematuria  Hereditary syndromes: None  ECOG Performance Score: 0  0=Fully active, 1=Unable to do strenuous activity but capable of light work, 2=Ambulatory and capable of all selfcare, 3=Capable of limited selfcare, confined to bed >50% of waking hours, 4=Completely disabled.      Patient Active Problem List    Diagnosis Date Noted     Essential hypertension 05/03/2017     Priority: Medium     Gross hematuria 05/03/2017     Priority: Medium     Past Medical History:   Diagnosis Date     HTN (hypertension)      Liver hemangioma      Renal mass, right      No past surgical history on file.  Current Outpatient Prescriptions   Medication Sig Dispense Refill     amLODIPine (NORVASC) 10 MG tablet Take 10 mg by mouth every morning         No Known Allergies  Family History   Problem Relation Age of Onset     Abdominal Aortic Aneurysm Mother 93     Gallbladder problem      Social History     Occupational History     Not on file.     Social History Main Topics     Smoking status: Former Smoker     Packs/day: 0.50     Years: 3.00     Types: Cigarettes     Smokeless tobacco: Former User     Quit date: 6/9/1972     Alcohol use 12.6 oz/week     21 Glasses of wine per week      Comment: 3 glasses of wine per day     Drug use: No     Sexual activity: Not on file     REVIEW OF SYSTEMS  Answers for HPI/ROS submitted by the patient on 6/9/2017     PHYSICAL EXAM  Vitals:    06/09/17 0906   BP: 165/87   Pulse: 60   Weight: 78.2 kg (172 lb 6.4 oz)   Height: 1.803 m (5' 11\")     Wt Readings from Last 3 Encounters:   06/09/17 78.2 kg (172 lb 6.4 oz)   06/09/17 78.2 kg (172 lb 6.4 oz)     Constitutional: Alert, no acute distress  Psychiatric: Normal mood and affect  Head: Normocephalic.  Neck: Neck supple. No adenopathy. Thyroid symmetric, normal size  ENT: Oropharynx clear.  Back: No spinal tenderness.  No costovertebral angle tenderness  Cardiovascular: RRR. No murmurs, clicks gallops or rub  Respiratory: Good diaphragmatic " excursion. Lungs clear  Gastrointestinal: Abdomen soft, non-tender. No masses, organomegaly. No hernia  Skin: no suspicious lesions or rashes on abdomen  Extremities: No lower extremity edema.  No clubbing or cyanosis.  Neurologic:  Cranial nerves grossly intact.  Equal strength and sensation on bilateral extremities.   : Deferred     Recent Labs   Lab Test  06/09/17   1125   WBC  5.8   HGB  15.9   HCT  45.7   PLT  183     Recent Labs   Lab Test  06/09/17   1125   NA  140   POTASSIUM  4.2   CHLORIDE  106   CO2  26   ANIONGAP  8   GLC  118*   BUN  23   CR  0.98   GFRESTIMATED  76   GFRESTBLACK  >90   GFR Calc     YARITZA  9.5     Recent Labs   Lab Test  06/09/17   1131   COLOR  Yellow   APPEARANCE  Clear   URINEGLC  Negative   URINEBILI  Negative   URINEKETONE  Negative   SG  1.019   UBLD  Negative   URINEPH  6.0   PROTEIN  Negative   NITRITE  Negative   LEUKEST  Negative   RBCU  1   WBCU  1       Aleksandr TOVAR, reviewed these laboratory values.      CC  Patient Care Team:  Magali Douglas, NP as PCP - General (Family Practice)  Shelby Gordon as Referring Physician (Hematology & Oncology)  Eileen Michel, RN as Registered Nurse (Urology)      Copy to patient  LYRIC BROWN LAURENCE  [address] [facility] [address]      Again, thank you for allowing me to participate in the care of your patient.      Sincerely,    Lyric Burger MD

## 2017-06-09 NOTE — PATIENT INSTRUCTIONS
Blood work today.    Schedule appointment for MRI next Friday.  Dr. Burger will notify you of the results.    Schedule surgery with Dr. Burger.    It was a pleasure meeting with you today.  Thank you for allowing me and my team the privilege of caring for you today.  YOU are the reason we are here, and I truly hope we provided you with the excellent service you deserve.  Please let us know if there is anything else we can do for you so that we can be sure you are leaving completely satisfied with your care experience.      BOBBY Greer

## 2017-06-09 NOTE — NURSING NOTE
Pre Op Teaching Flowsheet       Pre and Post op Patient Education  Relevant Diagnosis:  mass  Teaching Topic:  Pre and post op teaching  Person Involved in teaching: Davon Hui    Motivation Level:  Asks Questions: Yes  Eager to Learn:  Yes  Cooperative: Yes  Receptive (willing/able to accept information):  Yes  Patient demonstrates understanding of the following:  Date and time of surgery:    Location of surgery:  [address] [place] [address]  History and Physical and any other testing necessary prior to surgery: PAC today  Required time line for completion of History and Physical and any pre-op testing: Yes    NPO Guidelines: Nothing to eat 8hrs prior, Nothing to drink 2hrs prior    Patient demonstrates understanding of the following:  Pre-op bowel prep:  Yes  Pre-op showering/scrub information with PCMX Soap: Yes  Medications to take the day of surgery:  Per PCP  Blood thinner medications discussed and when to stop (if applicable):  Yes  Diabetes medication management (if applicable):  N/A  Discussed pain control after surgery: pain medications  Infection Prevention: Patient demonstrates understanding of the following:  Patient instructed on hand hygiene:  Yes  Surgical procedure site care taught: Yes  Signs and symptoms of infection taught:  Yes  Wound care will be taught at the time of discharge.  Central venous catheter care will be taught at the time of discharge (if applicable).    Post-op follow-up:  Discussed how to contact the hospital, nurse, and clinic scheduling staff if necessary.    Instructional materials used/given/mailed:  [facility] Booklet, post op teaching sheet, Map, Soap, and arrival/location information.    Surgical instructions packet given to patient in office:  Yes.

## 2017-06-09 NOTE — MR AVS SNAPSHOT
After Visit Summary   6/9/2017    Davon Hui    MRN: [id number]           Patient Information     Date Of Birth          1950        Visit Information        Provider Department      6/9/2017 10:30 AM Nurse, [facility] Urology and Inst for Prostate and Urologic Cancers        Today's Diagnoses     Malignant neoplasm of right kidney (H)    -  1       Follow-ups after your visit        Your next 10 appointments already scheduled     Jun 09, 2017 12:30 PM CDT   (Arrive by 12:15 PM)   PAC RN ASSESSMENT with  Pac Rn   [facility] Preoperative Assessment Center ([facility])    [address] [place] [address]  [address] [facility] [address]   [phone]            Jun 09, 2017  1:10 PM CDT   (Arrive by 12:55 PM)   PAC Anesthesia Consult with  Pac Anesthesiologist   [facility] [place] ([facility])    [address] [place] [address]   [phone]            Jun 16, 2017  1:45 PM CDT   (Arrive by 1:30 PM)   MR ABDOMEN W/O & W CONTRAST ANGIOGRAM with [address] MRI ([facility])    [address] [place] [address]   [phone]           Take your medicines as usual, unless your doctor tells you not to. Bring a list of your current medicines to your exam (including vitamins, minerals and over-the-counter drugs). Also bring the results of similar scans you may have had.    The day before your exam, drink extra fluids at least six 8-ounce glasses (unless your doctor tells you to restrict your fluids).   Have a blood test (creatinine test) within 30 days of your exam. Go to your clinic or Diagnostic Imaging Department for this test.   Do not eat or drink for 6 hours prior to exam.  The MRI machine uses a strong magnet. Please wear clothes without metal (snaps, zippers). A sweatsuit works well, or we may give you a  hospital gown.  Please remove any body piercings and hair extensions before you arrive. You will also remove watches, jewelry, hairpins, wallets, dentures, partial dental plates and hearing aids. You may wear contact lenses, and you may be able to wear your rings. We have a safe place to keep your personal items, but it is safer to leave them at home.   **IMPORTANT** THE INSTRUCTIONS BELOW ARE ONLY FOR THOSE PATIENTS WHO HAVE BEEN TOLD THEY WILL RECEIVE SEDATION OR GENERAL ANESTHESIA DURING THEIR MRI PROCEDURE:  IF YOU WILL RECEIVE SEDATION (take medicine to help you relax during your exam):   You must get the medicine from your doctor before you arrive. Bring the medicine to the exam. Do not take it at home.   Arrive one hour early. Bring someone who can take you home after the test. Your medicine will make you sleepy. After the exam, you may not drive, take a bus or take a taxi by yourself.   No eating 8 hours before your exam. You may have clear liquids up until 4 hours before your exam. (Clear liquids include water, clear tea, black coffee and fruit juice without pulp.)  IF YOU WILL RECEIVE ANESTHESIA (be asleep for your exam):   Arrive 1 1/2 hours early. Bring someone who can take you home after the test. You may not drive, take a bus or take a taxi by yourself.   No eating 8 hours before your exam. You may have clear liquids up until 4 hours before your exam. (Clear liquids include water, clear tea, black coffee and fruit juice without pulp.)  If you have any questions, please contact your Imaging Department exam site.            Jun 21, 2017   Procedure with Davon Burger MD   [facility] [place], Same Day Surgery (--)    [address] [facility] [address]   [phone]            Jul 07, 2017 11:00 AM CDT   (Arrive by 10:45 AM)   Post-Op with Davon Burger MD   [facility] Urology and [facility] for Prostate and Urologic Cancers ([facility])    [address] [place] [address]  [facility] [address] [phone]              Future tests that were ordered for you today     Open Future Orders        Priority Expected Expires Ordered    MRA Angiogram Abdomen w & wo Contrast Routine  7/14/2018 6/9/2017            Who to contact     Please call your clinic at [phone] to:    Ask questions about your health    Make or cancel appointments    Discuss your medicines    Learn about your test results    Speak to your doctor   If you have compliments or concerns about an experience at your clinic, or if you wish to file a complaint, please contact [facility] Patient Relations at [phone] or email us at [email or web address]         Additional Information About Your Visit        [employer] Information     [employer] gives you secure access to your electronic health record. If you see a primary care provider, you can also send messages to your care team and make appointments. If you have questions, please call your primary care clinic.  If you do not have a primary care provider, please call [phone] and they will assist you.      [employer] is an electronic gateway that provides easy, online access to your medical records. With [employer], you can request a clinic appointment, read your test results, renew a prescription or communicate with your care team.     To access your existing account, please contact your [facility] Clinic or call [phone] for assistance.        Care EveryWhere ID     This is your Care EveryWhere ID. This could be used by other organizations to access your [place] medical records  [id number]         Blood Pressure from Last 3 Encounters:   06/09/17 161/90   06/09/17 165/87    Weight from Last 3 Encounters:   06/09/17 78.2 kg (172 lb 6.4 oz)   06/09/17 78.2 kg (172 lb 6.4 oz)              Today, you had the following     No orders found for display       Primary Care Provider Office Phone # Fax #     Magali Douglas [phone]       [facility] [address]  [facility] [address]        Thank you!     Thank you for choosing [facility] UROLOGY AND [facility] FOR PROSTATE AND UROLOGIC CANCERS  for your care. Our goal is always to provide you with excellent care. Hearing back from our patients is one way we can continue to improve our services. Please take a few minutes to complete the written survey that you may receive in the mail after your visit with us. Thank you!             Your Updated Medication List - Protect others around you: Learn how to safely use, store and throw away your medicines at www.disposemymeds.org.          This list is accurate as of: 6/9/17 11:41 AM.  Always use your most recent med list.                   Brand Name Dispense Instructions for use    amLODIPine 10 MG tablet    NORVASC     Take 10 mg by mouth every morning

## 2017-06-09 NOTE — ANESTHESIA PREPROCEDURE EVALUATION
Anesthesia Evaluation     . Pt has not had prior anesthetic            ROS/MED HX    ENT/Pulmonary:     (+)ERENDIRA risk factors snores loudly, hypertension, daytime somnolence, tobacco use, Past use very minimal use packs/day  , . .    Neurologic:  - neg neurologic ROS     Cardiovascular:     (+) hypertension-range: 140-160/, ---. : . . . :. . Previous cardiac testing date:results:date: results:ECG reviewed date:6/9/17 results:SB at 56 bpm. QTc 413 date: results:         (-) CAD, taking anticoagulants/antiplatelets and TORRES   METS/Exercise Tolerance: Comment: Works out.  Can do 1 hour cardio and fast walks.    Hematologic:  - neg hematologic  ROS       Musculoskeletal:  - neg musculoskeletal ROS (+) , , other musculoskeletal (low back pain)-       GI/Hepatic:  - neg GI/hepatic ROS       Renal/Genitourinary:     (+) Other Renal/ Genitourinary, right renal mass, recent hematuria      Endo:  - neg endo ROS       Psychiatric:  - neg psychiatric ROS       Infectious Disease:  - neg infectious disease ROS       Malignancy:   (+) Malignancy History of Other  Other CA New right renal mass concerning for malignancy Active status post         Other:                     Physical Exam  Normal systems: dental    Airway   Mallampati: III  TM distance: >3 FB  Neck ROM: full    Dental     Cardiovascular   Rhythm and rate: regular and normal      Pulmonary    breath sounds clear to auscultation    Other findings:   6/9/2017 11:25  Sodium: 140  Potassium: 4.2  Chloride: 106  Carbon Dioxide: 26  Urea Nitrogen: 23  Creatinine: 0.98  GFR Estimate: 76  GFR Estimate If Black: >90...  Calcium: 9.5  Anion Gap: 8  Albumin: 4.3  Protein Total: 7.3  Bilirubin Total: 0.8  Alkaline Phosphatase: 74  ALT: 32  AST: 21  Glucose: 118 (H)    WBC: 5.8  Hemoglobin: 15.9  Hematocrit: 45.7  Platelet Count: 183  RBC Count: 5.05  MCV: 91  MCH: 31.5  MCHC: 34.8  RDW: 12.6  Diff Method: Automated Method  % Neutrophils: 76.1  % Lymphocytes: 15.1  % Monocytes:  7.0  % Eosinophils: 0.3  % Basophils: 1.0  % Immature Granulocytes: 0.5  Nucleated RBCs: 0  Absolute Neutrophil: 4.4  Absolute Lymphocytes: 0.9  Absolute Monocytes: 0.4  Absolute Eosinophils: 0.0  Absolute Basophils: 0.1  Abs Immature Granulocytes: 0.0  Absolute Nucleated RBC: 0.0  INR: 1.04      6/9/2017 11:30    6/9/2017 11:31  Color Urine: Yellow  Appearance Urine: Clear  Glucose Urine: Negative  Bilirubin Urine: Negative  Ketones Urine: Negative  Specific Gravity Urine: 1.019  pH Urine: 6.0  Protein Albumin Urine: Negative  Urobilinogen mg/dL: 0.0  Nitrite Urine: Negative  Blood Urine: Negative  Leukocyte Esterase Urine: Negative  Source: Midstream Urine  WBC Urine: 1  RBC Urine: 1  Mucous Urine: Present (A)                   PAC Discussion and Assessment    ASA Classification: 2  Case is suitable for: [place]  Anesthetic techniques and relevant risks discussed: GA  Invasive monitoring and risk discussed: Yes  Types:   Possibility and Risk of blood transfusion discussed: Yes  NPO instructions given:   Additional anesthetic preparation and risks discussed:   Needs early admission to pre-op area:   Other:     PAC Resident/NP Anesthesia Assessment:  Large enhancing mass in the right kidney with extension into the right renal vein consistent with a renal cell carcinoma.     Davon Hui is a 67 year old male scheduled to undergo right nephrectomy and ? Liver surgery with Dr. Burger on 6/21/17 at [facility].    Pre-operative considerations:  1.  Cardiac:  Functional status excellent.  Can do 1 hour of cardio. 0.9%  risk of major adverse cardiac event.  Risk of MACE < 1%. METS>4.  No further cardiac evaluation needed per 2014 ACC/AHA guidelines for non-cardiac surgery.  EKG normal.   2.  Pulm:  Airway feasible.  ERENDIRA risk:  High.  Recommended OP sleep study.  Non-smoker.   3.  GI:  Risk of PONV score = 1.  If > 2, anti-emetic intervention recommended.    4.  :  Hematuria resolved.      **this is his first surgery and  hospitalization    Patient is optimized and is acceptable candidate for the proposed procedure.  No further diagnostic evaluation is needed.     Patient also evaluated by Dr. Jung. See recommendations below.           Reviewed and Signed by PAC Mid-Level Provider/Resident  Mid-Level Provider/Resident: Jessica Leyva PA-C  Date: 6/9/17  Time: 1141    Attending Anesthesiologist Anesthesia Assessment:  I have examined the patient and reviewed the chart.  I have discussed the patient with the BRYAN and concur with her assessment.  PE:  MPC 2 airway, good extension, 3 FBTMD.  Lungs clear, CVS RRR with no murmur  No further testing is required at this time.  Discussed GA, possible arterial line, possible central line, possible transfusion, possible regional pain block.  Final plan per attending the day of surgery.      Reviewed and Signed by PAC Anesthesiologist  Anesthesiologist: Ramiro Jung MD  Date: 06/09/2017  Time:   Pass/Fail:   Disposition:     PAC Pharmacist Assessment:        Pharmacist:   Date:   Time:      Anesthesia Plan      History & Physical Review      ASA Status:  2 .    NPO Status:  > 8 hours    Plan for General and ETT with Propofol and Intravenous induction. Maintenance will be Balanced.    PONV prophylaxis:  Ondansetron (or other 5HT-3)  Additional equipment: 2nd IV and Arterial Line      Postoperative Care      Consents        Procedure: Procedure(s):  Right Laparoscopic Possible Open Nephrectomy, Possible Lymphadenectomy, Possible Vena Cava, Possible Thrombectomy, Possible Liver Resection - Anesthesia with block - Wound Class:    - Wound Class:    - Wound Class:     HPI: Davon Hui is a 67 year old male who presents for the above procedure.     PMHx/PSHx:  Past Medical History:   Diagnosis Date     HTN (hypertension)      Liver hemangioma      Renal mass, right        No past surgical history on file.      No current facility-administered medications on file prior to encounter.   Current  Outpatient Prescriptions on File Prior to Encounter:  amLODIPine (NORVASC) 10 MG tablet Take 10 mg by mouth every morning        Social Hx:   Social History   Substance Use Topics     Smoking status: Former Smoker     Packs/day: 0.50     Years: 3.00     Types: Cigarettes     Smokeless tobacco: Former User     Quit date: 6/9/1972     Alcohol use 12.6 oz/week     21 Glasses of wine per week      Comment: 3 glasses of wine per day       Allergies: No Known Allergies      NPO Status: Per ASA Guidelines    Labs:    Blood Bank:  Lab Results   Component Value Date    ABO O 06/09/2017    RH  Neg 06/09/2017    AS Neg 06/09/2017     BMP:  Recent Labs   Lab Test  06/09/17   1125   NA  140   POTASSIUM  4.2   CHLORIDE  106   CO2  26   BUN  23   CR  0.98   GLC  118*   YARITZA  9.5     CBC:   Recent Labs   Lab Test  06/09/17   1125   WBC  5.8   RBC  5.05   HGB  15.9   HCT  45.7   MCV  91   MCH  31.5   MCHC  34.8   RDW  12.6   PLT  183     Coags:  Recent Labs   Lab Test  06/09/17   1125   INR  1.04       I have seen and evaluated the patient and agree with the above assessment and plan by Dr. Chidls.  Shanae Joshi  June 21, 2017                    .

## 2017-06-10 LAB
BACTERIA SPEC CULT: NO GROWTH
Lab: NORMAL
MICRO REPORT STATUS: NORMAL
SPECIMEN SOURCE: NORMAL

## 2017-06-12 LAB
COPATH REPORT: NORMAL
INTERPRETATION ECG - MUSE: NORMAL

## 2017-06-20 PROBLEM — C64.1 MALIGNANT NEOPLASM OF RIGHT KIDNEY (H): Status: ACTIVE | Noted: 2017-06-20

## 2017-06-21 ENCOUNTER — ANESTHESIA (OUTPATIENT)
Dept: SURGERY | Facility: CLINIC | Age: 67
DRG: 658 | End: 2017-06-21
Payer: MEDICARE

## 2017-06-21 ENCOUNTER — HOSPITAL ENCOUNTER (INPATIENT)
Facility: CLINIC | Age: 67
LOS: 1 days | Discharge: HOME | DRG: 658 | End: 2017-06-22
Attending: UROLOGY | Admitting: UROLOGY
Payer: MEDICARE

## 2017-06-21 DIAGNOSIS — C64.1 MALIGNANT NEOPLASM OF RIGHT KIDNEY (H): Primary | ICD-10-CM

## 2017-06-21 PROBLEM — Z90.5 S/P NEPHRECTOMY: Status: ACTIVE | Noted: 2017-06-21

## 2017-06-21 LAB
ABO + RH BLD: NORMAL
ABO + RH BLD: NORMAL
ANION GAP SERPL CALCULATED.3IONS-SCNC: 7 MMOL/L (ref 3–14)
BASE DEFICIT BLDA-SCNC: 2.3 MMOL/L
BASE DEFICIT BLDA-SCNC: 2.8 MMOL/L
BLD GP AB SCN SERPL QL: NORMAL
BLD PROD TYP BPU: NORMAL
BLOOD BANK CMNT PATIENT-IMP: NORMAL
BLOOD BANK CMNT PATIENT-IMP: NORMAL
BUN SERPL-MCNC: 24 MG/DL (ref 7–30)
CA-I BLD-MCNC: 4.7 MG/DL (ref 4.4–5.2)
CA-I BLD-MCNC: 4.9 MG/DL (ref 4.4–5.2)
CALCIUM SERPL-MCNC: 8.9 MG/DL (ref 8.5–10.1)
CHLORIDE SERPL-SCNC: 108 MMOL/L (ref 94–109)
CO2 SERPL-SCNC: 25 MMOL/L (ref 20–32)
CREAT SERPL-MCNC: 1.14 MG/DL (ref 0.66–1.25)
ERYTHROCYTE [DISTWIDTH] IN BLOOD BY AUTOMATED COUNT: 13 % (ref 10–15)
GFR SERPL CREATININE-BSD FRML MDRD: 64 ML/MIN/1.7M2
GLUCOSE BLD-MCNC: 179 MG/DL (ref 70–99)
GLUCOSE BLD-MCNC: 193 MG/DL (ref 70–99)
GLUCOSE SERPL-MCNC: 182 MG/DL (ref 70–99)
HCO3 BLD-SCNC: 23 MMOL/L (ref 21–28)
HCO3 BLD-SCNC: 23 MMOL/L (ref 21–28)
HCT VFR BLD AUTO: 39.9 % (ref 40–53)
HGB BLD-MCNC: 14.1 G/DL (ref 13.3–17.7)
HGB BLD-MCNC: 14.8 G/DL (ref 13.3–17.7)
HGB BLD-MCNC: 15.1 G/DL (ref 13.3–17.7)
MCH RBC QN AUTO: 31.5 PG (ref 26.5–33)
MCHC RBC AUTO-ENTMCNC: 35.3 G/DL (ref 31.5–36.5)
MCV RBC AUTO: 89 FL (ref 78–100)
NUM BPU REQUESTED: 2
O2/TOTAL GAS SETTING VFR VENT: 60 %
O2/TOTAL GAS SETTING VFR VENT: 60 %
PCO2 BLD: 42 MM HG (ref 35–45)
PCO2 BLD: 43 MM HG (ref 35–45)
PH BLD: 7.34 PH (ref 7.35–7.45)
PH BLD: 7.34 PH (ref 7.35–7.45)
PLATELET # BLD AUTO: 157 10E9/L (ref 150–450)
PLATELET # BLD AUTO: 165 10E9/L (ref 150–450)
PO2 BLD: 155 MM HG (ref 80–105)
PO2 BLD: 155 MM HG (ref 80–105)
POTASSIUM BLD-SCNC: 4.3 MMOL/L (ref 3.4–5.3)
POTASSIUM BLD-SCNC: 4.4 MMOL/L (ref 3.4–5.3)
POTASSIUM SERPL-SCNC: 4 MMOL/L (ref 3.4–5.3)
RBC # BLD AUTO: 4.48 10E12/L (ref 4.4–5.9)
SODIUM BLD-SCNC: 137 MMOL/L (ref 133–144)
SODIUM BLD-SCNC: 138 MMOL/L (ref 133–144)
SODIUM SERPL-SCNC: 140 MMOL/L (ref 133–144)
SPECIMEN EXP DATE BLD: NORMAL
WBC # BLD AUTO: 12 10E9/L (ref 4–11)

## 2017-06-21 PROCEDURE — 82947 ASSAY GLUCOSE BLOOD QUANT: CPT | Performed by: ANESTHESIOLOGY

## 2017-06-21 PROCEDURE — C9399 UNCLASSIFIED DRUGS OR BIOLOG: HCPCS | Performed by: ANESTHESIOLOGY

## 2017-06-21 PROCEDURE — 88342 IMHCHEM/IMCYTCHM 1ST ANTB: CPT | Performed by: UROLOGY

## 2017-06-21 PROCEDURE — P9041 ALBUMIN (HUMAN),5%, 50ML: HCPCS | Performed by: ANESTHESIOLOGY

## 2017-06-21 PROCEDURE — 25000125 ZZHC RX 250: Performed by: ANESTHESIOLOGY

## 2017-06-21 PROCEDURE — 36415 COLL VENOUS BLD VENIPUNCTURE: CPT | Performed by: STUDENT IN AN ORGANIZED HEALTH CARE EDUCATION/TRAINING PROGRAM

## 2017-06-21 PROCEDURE — 85027 COMPLETE CBC AUTOMATED: CPT | Performed by: STUDENT IN AN ORGANIZED HEALTH CARE EDUCATION/TRAINING PROGRAM

## 2017-06-21 PROCEDURE — 71000012 ZZH RECOVERY PHASE 1 LEVEL 1 FIRST HR: Performed by: UROLOGY

## 2017-06-21 PROCEDURE — A9270 NON-COVERED ITEM OR SERVICE: HCPCS | Mod: GY | Performed by: UROLOGY

## 2017-06-21 PROCEDURE — 25000125 ZZHC RX 250

## 2017-06-21 PROCEDURE — 25000565 ZZH ISOFLURANE, EA 15 MIN: Performed by: UROLOGY

## 2017-06-21 PROCEDURE — 36000062 ZZH SURGERY LEVEL 4 1ST 30 MIN - UMMC: Performed by: UROLOGY

## 2017-06-21 PROCEDURE — 37000009 ZZH ANESTHESIA TECHNICAL FEE, EACH ADDTL 15 MIN: Performed by: UROLOGY

## 2017-06-21 PROCEDURE — 88332 PATH CONSLTJ SURG EA ADD BLK: CPT | Performed by: UROLOGY

## 2017-06-21 PROCEDURE — 40000171 ZZH STATISTIC PRE-PROCEDURE ASSESSMENT III: Performed by: UROLOGY

## 2017-06-21 PROCEDURE — 88307 TISSUE EXAM BY PATHOLOGIST: CPT | Performed by: UROLOGY

## 2017-06-21 PROCEDURE — 88331 PATH CONSLTJ SURG 1 BLK 1SPC: CPT | Performed by: UROLOGY

## 2017-06-21 PROCEDURE — 25000128 H RX IP 250 OP 636: Performed by: UROLOGY

## 2017-06-21 PROCEDURE — 25000132 ZZH RX MED GY IP 250 OP 250 PS 637: Mod: GY | Performed by: UROLOGY

## 2017-06-21 PROCEDURE — A9270 NON-COVERED ITEM OR SERVICE: HCPCS | Mod: GY | Performed by: STUDENT IN AN ORGANIZED HEALTH CARE EDUCATION/TRAINING PROGRAM

## 2017-06-21 PROCEDURE — C9290 INJ, BUPIVACAINE LIPOSOME: HCPCS | Performed by: ANESTHESIOLOGY

## 2017-06-21 PROCEDURE — 25000132 ZZH RX MED GY IP 250 OP 250 PS 637: Mod: GY | Performed by: STUDENT IN AN ORGANIZED HEALTH CARE EDUCATION/TRAINING PROGRAM

## 2017-06-21 PROCEDURE — 84132 ASSAY OF SERUM POTASSIUM: CPT | Performed by: ANESTHESIOLOGY

## 2017-06-21 PROCEDURE — 25000128 H RX IP 250 OP 636: Performed by: ANESTHESIOLOGY

## 2017-06-21 PROCEDURE — 0TT00ZZ RESECTION OF RIGHT KIDNEY, OPEN APPROACH: ICD-10-PCS | Performed by: UROLOGY

## 2017-06-21 PROCEDURE — 82803 BLOOD GASES ANY COMBINATION: CPT | Performed by: ANESTHESIOLOGY

## 2017-06-21 PROCEDURE — 82330 ASSAY OF CALCIUM: CPT | Performed by: ANESTHESIOLOGY

## 2017-06-21 PROCEDURE — 12000008 ZZH R&B INTERMEDIATE UMMC

## 2017-06-21 PROCEDURE — 85049 AUTOMATED PLATELET COUNT: CPT | Performed by: STUDENT IN AN ORGANIZED HEALTH CARE EDUCATION/TRAINING PROGRAM

## 2017-06-21 PROCEDURE — 80048 BASIC METABOLIC PNL TOTAL CA: CPT | Performed by: STUDENT IN AN ORGANIZED HEALTH CARE EDUCATION/TRAINING PROGRAM

## 2017-06-21 PROCEDURE — 27210794 ZZH OR GENERAL SUPPLY STERILE: Performed by: UROLOGY

## 2017-06-21 PROCEDURE — 88341 IMHCHEM/IMCYTCHM EA ADD ANTB: CPT | Performed by: UROLOGY

## 2017-06-21 PROCEDURE — 36000064 ZZH SURGERY LEVEL 4 EA 15 ADDTL MIN - UMMC: Performed by: UROLOGY

## 2017-06-21 PROCEDURE — 25000128 H RX IP 250 OP 636: Performed by: STUDENT IN AN ORGANIZED HEALTH CARE EDUCATION/TRAINING PROGRAM

## 2017-06-21 PROCEDURE — 37000008 ZZH ANESTHESIA TECHNICAL FEE, 1ST 30 MIN: Performed by: UROLOGY

## 2017-06-21 PROCEDURE — 84295 ASSAY OF SERUM SODIUM: CPT | Performed by: ANESTHESIOLOGY

## 2017-06-21 PROCEDURE — 88305 TISSUE EXAM BY PATHOLOGIST: CPT | Performed by: UROLOGY

## 2017-06-21 RX ORDER — GABAPENTIN 300 MG/1
300 CAPSULE ORAL ONCE
Status: COMPLETED | OUTPATIENT
Start: 2017-06-21 | End: 2017-06-21

## 2017-06-21 RX ORDER — HEPARIN SODIUM 5000 [USP'U]/.5ML
5000 INJECTION, SOLUTION INTRAVENOUS; SUBCUTANEOUS
Status: COMPLETED | OUTPATIENT
Start: 2017-06-21 | End: 2017-06-21

## 2017-06-21 RX ORDER — ONDANSETRON 4 MG/1
4 TABLET, ORALLY DISINTEGRATING ORAL EVERY 6 HOURS PRN
Status: DISCONTINUED | OUTPATIENT
Start: 2017-06-21 | End: 2017-06-22 | Stop reason: HOSPADM

## 2017-06-21 RX ORDER — ACETAMINOPHEN 325 MG/1
975 TABLET ORAL EVERY 8 HOURS
Status: DISCONTINUED | OUTPATIENT
Start: 2017-06-21 | End: 2017-06-22 | Stop reason: HOSPADM

## 2017-06-21 RX ORDER — HEPARIN SODIUM 5000 [USP'U]/.5ML
5000 INJECTION, SOLUTION INTRAVENOUS; SUBCUTANEOUS EVERY 8 HOURS
Status: DISCONTINUED | OUTPATIENT
Start: 2017-06-22 | End: 2017-06-22 | Stop reason: HOSPADM

## 2017-06-21 RX ORDER — ACETAMINOPHEN 325 MG/1
975 TABLET ORAL ONCE
Status: COMPLETED | OUTPATIENT
Start: 2017-06-21 | End: 2017-06-21

## 2017-06-21 RX ORDER — AMOXICILLIN 250 MG
1-2 CAPSULE ORAL 2 TIMES DAILY
Status: DISCONTINUED | OUTPATIENT
Start: 2017-06-21 | End: 2017-06-22 | Stop reason: HOSPADM

## 2017-06-21 RX ORDER — FLUMAZENIL 0.1 MG/ML
0.2 INJECTION, SOLUTION INTRAVENOUS
Status: DISCONTINUED | OUTPATIENT
Start: 2017-06-21 | End: 2017-06-21 | Stop reason: HOSPADM

## 2017-06-21 RX ORDER — AMLODIPINE BESYLATE 10 MG/1
10 TABLET ORAL EVERY MORNING
Status: DISCONTINUED | OUTPATIENT
Start: 2017-06-22 | End: 2017-06-22 | Stop reason: HOSPADM

## 2017-06-21 RX ORDER — KETOROLAC TROMETHAMINE 30 MG/ML
INJECTION, SOLUTION INTRAMUSCULAR; INTRAVENOUS PRN
Status: DISCONTINUED | OUTPATIENT
Start: 2017-06-21 | End: 2017-06-21

## 2017-06-21 RX ORDER — PROPOFOL 10 MG/ML
INJECTION, EMULSION INTRAVENOUS PRN
Status: DISCONTINUED | OUTPATIENT
Start: 2017-06-21 | End: 2017-06-21

## 2017-06-21 RX ORDER — CEFAZOLIN SODIUM 2 G/100ML
2 INJECTION, SOLUTION INTRAVENOUS
Status: COMPLETED | OUTPATIENT
Start: 2017-06-21 | End: 2017-06-21

## 2017-06-21 RX ORDER — HYDROMORPHONE HYDROCHLORIDE 1 MG/ML
.3-.5 INJECTION, SOLUTION INTRAMUSCULAR; INTRAVENOUS; SUBCUTANEOUS
Status: DISCONTINUED | OUTPATIENT
Start: 2017-06-21 | End: 2017-06-22 | Stop reason: HOSPADM

## 2017-06-21 RX ORDER — GABAPENTIN 100 MG/1
100 CAPSULE ORAL 3 TIMES DAILY
Status: DISCONTINUED | OUTPATIENT
Start: 2017-06-21 | End: 2017-06-22 | Stop reason: HOSPADM

## 2017-06-21 RX ORDER — METOPROLOL TARTRATE 1 MG/ML
1-2 INJECTION, SOLUTION INTRAVENOUS EVERY 5 MIN PRN
Status: DISCONTINUED | OUTPATIENT
Start: 2017-06-21 | End: 2017-06-21 | Stop reason: HOSPADM

## 2017-06-21 RX ORDER — ONDANSETRON 2 MG/ML
4 INJECTION INTRAMUSCULAR; INTRAVENOUS EVERY 6 HOURS PRN
Status: DISCONTINUED | OUTPATIENT
Start: 2017-06-21 | End: 2017-06-22 | Stop reason: HOSPADM

## 2017-06-21 RX ORDER — BUPIVACAINE HYDROCHLORIDE AND EPINEPHRINE 2.5; 5 MG/ML; UG/ML
INJECTION, SOLUTION INFILTRATION; PERINEURAL PRN
Status: DISCONTINUED | OUTPATIENT
Start: 2017-06-21 | End: 2017-06-21

## 2017-06-21 RX ORDER — LIDOCAINE 40 MG/G
CREAM TOPICAL
Status: DISCONTINUED | OUTPATIENT
Start: 2017-06-21 | End: 2017-06-22 | Stop reason: HOSPADM

## 2017-06-21 RX ORDER — ONDANSETRON 4 MG/1
4 TABLET, ORALLY DISINTEGRATING ORAL EVERY 30 MIN PRN
Status: DISCONTINUED | OUTPATIENT
Start: 2017-06-21 | End: 2017-06-21 | Stop reason: HOSPADM

## 2017-06-21 RX ORDER — ONDANSETRON 2 MG/ML
4 INJECTION INTRAMUSCULAR; INTRAVENOUS EVERY 30 MIN PRN
Status: DISCONTINUED | OUTPATIENT
Start: 2017-06-21 | End: 2017-06-21 | Stop reason: HOSPADM

## 2017-06-21 RX ORDER — SODIUM CHLORIDE, SODIUM LACTATE, POTASSIUM CHLORIDE, CALCIUM CHLORIDE 600; 310; 30; 20 MG/100ML; MG/100ML; MG/100ML; MG/100ML
INJECTION, SOLUTION INTRAVENOUS CONTINUOUS PRN
Status: DISCONTINUED | OUTPATIENT
Start: 2017-06-21 | End: 2017-06-21

## 2017-06-21 RX ORDER — FENTANYL CITRATE 50 UG/ML
25-50 INJECTION, SOLUTION INTRAMUSCULAR; INTRAVENOUS
Status: DISCONTINUED | OUTPATIENT
Start: 2017-06-21 | End: 2017-06-21 | Stop reason: HOSPADM

## 2017-06-21 RX ORDER — DEXAMETHASONE SODIUM PHOSPHATE 4 MG/ML
INJECTION, SOLUTION INTRA-ARTICULAR; INTRALESIONAL; INTRAMUSCULAR; INTRAVENOUS; SOFT TISSUE PRN
Status: DISCONTINUED | OUTPATIENT
Start: 2017-06-21 | End: 2017-06-21

## 2017-06-21 RX ORDER — NALOXONE HYDROCHLORIDE 0.4 MG/ML
.1-.4 INJECTION, SOLUTION INTRAMUSCULAR; INTRAVENOUS; SUBCUTANEOUS
Status: DISCONTINUED | OUTPATIENT
Start: 2017-06-21 | End: 2017-06-21 | Stop reason: HOSPADM

## 2017-06-21 RX ORDER — ALBUMIN, HUMAN INJ 5% 5 %
SOLUTION INTRAVENOUS CONTINUOUS PRN
Status: DISCONTINUED | OUTPATIENT
Start: 2017-06-21 | End: 2017-06-21

## 2017-06-21 RX ORDER — SODIUM CHLORIDE, SODIUM LACTATE, POTASSIUM CHLORIDE, CALCIUM CHLORIDE 600; 310; 30; 20 MG/100ML; MG/100ML; MG/100ML; MG/100ML
INJECTION, SOLUTION INTRAVENOUS CONTINUOUS
Status: DISCONTINUED | OUTPATIENT
Start: 2017-06-21 | End: 2017-06-21 | Stop reason: HOSPADM

## 2017-06-21 RX ORDER — FENTANYL CITRATE 50 UG/ML
INJECTION, SOLUTION INTRAMUSCULAR; INTRAVENOUS PRN
Status: DISCONTINUED | OUTPATIENT
Start: 2017-06-21 | End: 2017-06-21

## 2017-06-21 RX ORDER — NALOXONE HYDROCHLORIDE 0.4 MG/ML
.1-.4 INJECTION, SOLUTION INTRAMUSCULAR; INTRAVENOUS; SUBCUTANEOUS
Status: DISCONTINUED | OUTPATIENT
Start: 2017-06-21 | End: 2017-06-22 | Stop reason: HOSPADM

## 2017-06-21 RX ORDER — CEFAZOLIN SODIUM 1 G/3ML
1 INJECTION, POWDER, FOR SOLUTION INTRAMUSCULAR; INTRAVENOUS SEE ADMIN INSTRUCTIONS
Status: DISCONTINUED | OUTPATIENT
Start: 2017-06-21 | End: 2017-06-21 | Stop reason: HOSPADM

## 2017-06-21 RX ORDER — KETOROLAC TROMETHAMINE 15 MG/ML
15 INJECTION, SOLUTION INTRAMUSCULAR; INTRAVENOUS EVERY 6 HOURS
Status: DISCONTINUED | OUTPATIENT
Start: 2017-06-21 | End: 2017-06-22 | Stop reason: HOSPADM

## 2017-06-21 RX ORDER — OXYCODONE HYDROCHLORIDE 5 MG/1
5-10 TABLET ORAL
Status: DISCONTINUED | OUTPATIENT
Start: 2017-06-21 | End: 2017-06-22 | Stop reason: HOSPADM

## 2017-06-21 RX ORDER — SODIUM CHLORIDE 9 MG/ML
INJECTION, SOLUTION INTRAVENOUS CONTINUOUS
Status: DISCONTINUED | OUTPATIENT
Start: 2017-06-21 | End: 2017-06-22 | Stop reason: HOSPADM

## 2017-06-21 RX ORDER — HEPARIN SODIUM 5000 [USP'U]/.5ML
5000 INJECTION, SOLUTION INTRAVENOUS; SUBCUTANEOUS EVERY 8 HOURS
Status: DISCONTINUED | OUTPATIENT
Start: 2017-06-22 | End: 2017-06-21

## 2017-06-21 RX ADMIN — PROPOFOL 150 MG: 10 INJECTION, EMULSION INTRAVENOUS at 09:41

## 2017-06-21 RX ADMIN — KETOROLAC TROMETHAMINE 30 MG: 30 INJECTION, SOLUTION INTRAMUSCULAR at 14:08

## 2017-06-21 RX ADMIN — ROCURONIUM BROMIDE 20 MG: 10 INJECTION INTRAVENOUS at 11:47

## 2017-06-21 RX ADMIN — FENTANYL CITRATE 50 MCG: 50 INJECTION, SOLUTION INTRAMUSCULAR; INTRAVENOUS at 13:44

## 2017-06-21 RX ADMIN — FENTANYL CITRATE 100 MCG: 50 INJECTION, SOLUTION INTRAMUSCULAR; INTRAVENOUS at 10:08

## 2017-06-21 RX ADMIN — SODIUM CHLORIDE: 9 INJECTION, SOLUTION INTRAVENOUS at 16:57

## 2017-06-21 RX ADMIN — BUPIVACAINE 15 ML: 13.3 INJECTION, SUSPENSION, LIPOSOMAL INFILTRATION at 09:43

## 2017-06-21 RX ADMIN — GABAPENTIN 300 MG: 300 CAPSULE ORAL at 08:13

## 2017-06-21 RX ADMIN — ROCURONIUM BROMIDE 20 MG: 10 INJECTION INTRAVENOUS at 12:32

## 2017-06-21 RX ADMIN — ACETAMINOPHEN 975 MG: 325 TABLET, FILM COATED ORAL at 08:13

## 2017-06-21 RX ADMIN — MIDAZOLAM HYDROCHLORIDE 2 MG: 1 INJECTION, SOLUTION INTRAMUSCULAR; INTRAVENOUS at 09:33

## 2017-06-21 RX ADMIN — DEXAMETHASONE SODIUM PHOSPHATE 8 MG: 4 INJECTION, SOLUTION INTRA-ARTICULAR; INTRALESIONAL; INTRAMUSCULAR; INTRAVENOUS; SOFT TISSUE at 10:33

## 2017-06-21 RX ADMIN — ROCURONIUM BROMIDE 20 MG: 10 INJECTION INTRAVENOUS at 13:20

## 2017-06-21 RX ADMIN — HEPARIN SODIUM 5000 UNITS: 5000 INJECTION, SOLUTION INTRAVENOUS; SUBCUTANEOUS at 23:07

## 2017-06-21 RX ADMIN — BUPIVACAINE HYDROCHLORIDE AND EPINEPHRINE BITARTRATE 10 ML: 2.5; .005 INJECTION, SOLUTION INFILTRATION; PERINEURAL at 09:43

## 2017-06-21 RX ADMIN — SODIUM CHLORIDE, POTASSIUM CHLORIDE, SODIUM LACTATE AND CALCIUM CHLORIDE: 600; 310; 30; 20 INJECTION, SOLUTION INTRAVENOUS at 09:33

## 2017-06-21 RX ADMIN — GABAPENTIN 100 MG: 100 CAPSULE ORAL at 20:09

## 2017-06-21 RX ADMIN — CEFAZOLIN 1 G: 1 INJECTION, POWDER, FOR SOLUTION INTRAMUSCULAR; INTRAVENOUS at 12:00

## 2017-06-21 RX ADMIN — HYDROMORPHONE HYDROCHLORIDE 0.3 MG: 1 INJECTION, SOLUTION INTRAMUSCULAR; INTRAVENOUS; SUBCUTANEOUS at 14:06

## 2017-06-21 RX ADMIN — FENTANYL CITRATE 25 MCG: 50 INJECTION, SOLUTION INTRAMUSCULAR; INTRAVENOUS at 15:12

## 2017-06-21 RX ADMIN — CEFAZOLIN SODIUM 2 G: 2 INJECTION, SOLUTION INTRAVENOUS at 10:00

## 2017-06-21 RX ADMIN — ROCURONIUM BROMIDE 20 MG: 10 INJECTION INTRAVENOUS at 10:39

## 2017-06-21 RX ADMIN — ROCURONIUM BROMIDE 10 MG: 10 INJECTION INTRAVENOUS at 11:12

## 2017-06-21 RX ADMIN — ACETAMINOPHEN 975 MG: 325 TABLET, FILM COATED ORAL at 23:08

## 2017-06-21 RX ADMIN — ROCURONIUM BROMIDE 50 MG: 10 INJECTION INTRAVENOUS at 09:41

## 2017-06-21 RX ADMIN — ALBUMIN (HUMAN): 12.5 SOLUTION INTRAVENOUS at 13:17

## 2017-06-21 RX ADMIN — ACETAMINOPHEN 975 MG: 325 TABLET, FILM COATED ORAL at 16:57

## 2017-06-21 RX ADMIN — ROCURONIUM BROMIDE 20 MG: 10 INJECTION INTRAVENOUS at 10:07

## 2017-06-21 RX ADMIN — SENNOSIDES AND DOCUSATE SODIUM 1 TABLET: 8.6; 5 TABLET ORAL at 20:09

## 2017-06-21 RX ADMIN — FENTANYL CITRATE 25 MCG: 50 INJECTION, SOLUTION INTRAMUSCULAR; INTRAVENOUS at 15:04

## 2017-06-21 RX ADMIN — SUGAMMADEX 200 MG: 100 INJECTION, SOLUTION INTRAVENOUS at 14:20

## 2017-06-21 RX ADMIN — HEPARIN SODIUM 5000 UNITS: 10000 INJECTION, SOLUTION INTRAVENOUS; SUBCUTANEOUS at 10:25

## 2017-06-21 RX ADMIN — ROCURONIUM BROMIDE 10 MG: 10 INJECTION INTRAVENOUS at 12:50

## 2017-06-21 RX ADMIN — KETOROLAC TROMETHAMINE 15 MG: 15 INJECTION, SOLUTION INTRAMUSCULAR; INTRAVENOUS at 20:09

## 2017-06-21 RX ADMIN — GABAPENTIN 100 MG: 100 CAPSULE ORAL at 16:57

## 2017-06-21 RX ADMIN — FENTANYL CITRATE 150 MCG: 50 INJECTION, SOLUTION INTRAMUSCULAR; INTRAVENOUS at 09:41

## 2017-06-21 ASSESSMENT — PAIN DESCRIPTION - DESCRIPTORS: DESCRIPTORS: SORE

## 2017-06-21 NOTE — ANESTHESIA POSTPROCEDURE EVALUATION
Patient: Davon Hui    Procedure(s):  Right Laparoscopic Possible Nephrectomy - Wound Class: II-Clean Contaminated    Diagnosis:Renal Mass   Diagnosis Additional Information: No value filed.    Anesthesia Type:  General, ETT    Note:  Anesthesia Post Evaluation    Patient location during evaluation: PACU  Patient participation: Able to fully participate in evaluation  Level of consciousness: awake  Pain management: adequate  Airway patency: patent  Cardiovascular status: acceptable  Respiratory status: acceptable  Hydration status: acceptable  PONV: none     Anesthetic complications: None          Last vitals:  Vitals:    06/21/17 1445 06/21/17 1500 06/21/17 1515   BP: 144/83 138/78 145/80   Resp: 16 16 16   Temp:  36.3  C (97.4  F) 36.7  C (98  F)   SpO2: 99% 98% 98%         Electronically Signed By: Shanae Joshi MD  June 21, 2017  3:39 PM

## 2017-06-21 NOTE — ANESTHESIA CARE TRANSFER NOTE
Patient: Davon Hui    Procedure(s):  Right Laparoscopic Possible Nephrectomy - Wound Class: II-Clean Contaminated    Diagnosis: Renal Mass   Diagnosis Additional Information: No value filed.    Anesthesia Type:   General, ETT     Note:  Airway :Face Mask  Patient transferred to:PACU  Comments: VSS. Breathing spontaneously at a regular rate with adequate tidal volumes and maintaining O2 sats on 6L facemask. Denies nausea or pain. No apparent complications from anesthesia.   Enrico Childs MD       Vitals: (Last set prior to Anesthesia Care Transfer)    CRNA VITALS  6/21/2017 1405 - 6/21/2017 1445      6/21/2017             Resp Rate (set): 10                Electronically Signed By: Enrico Childs MD  June 21, 2017  2:45 PM

## 2017-06-21 NOTE — IP AVS SNAPSHOT
MRN:[id number]                      After Visit Summary   6/21/2017    Davon Hui    MRN: [id number]           Thank you!     Thank you for choosing [facility] for your care. Our goal is always to provide you with excellent care. Hearing back from our patients is one way we can continue to improve our services. Please take a few minutes to complete the written survey that you may receive in the mail after you visit with us. Thank you!        Patient Information     Date Of Birth          1950        Designated Caregiver       Most Recent Value    Caregiver    Will someone help with your care after discharge? yes    Name of designated caregiver Kelsy Lorenz - wife    Phone number of caregiver [phone]    Caregiver address [address] [address]      About your hospital stay     You were admitted on:  June 21, 2017 You last received care in the:  [address] [facility]    You were discharged on:  June 22, 2017        Reason for your hospital stay       You were in the hospital to have surgery on your kidney                  Who to Call     For medical emergencies, please call 911.  For non-urgent questions about your medical care, please call your primary care provider or clinic, [phone]  For questions related to your surgery, please call your surgery clinic        Attending Provider     Provider Davon Wylie MD Urology       Primary Care Provider Office Phone # Fax #    Magali Douglas [phone]       When to contact your care team       - Call or return sooner than your regularly scheduled visit if you develop any of the following: fever (greater than 101.5), uncontrolled pain, uncontrolled nausea or vomiting, as well as increased redness, swelling, or drainage from your wound (if present).  Call or return to ER if you have blood in urine that is getting worse (rather than getting better) or if you are passing clots and  unable to urinate                  After Care Instructions     Activity       Activity  - No strenuous exercise for 6 weeks.  - No lifting, pushing, pulling more than 10 pounds for 6 weeks.   - Do not strain with bowel movements.  - Do not drive until you can press the brake pedal quickly and fully without pain.   - Do not operate a motor vehicle while taking narcotic pain medications (if prescribed)            Diet       Resume previous diet upon discharge            Discharge Instructions       Medications  - Transition from narcotic pain medications to tylenol (acetaminophen) as you are able.  Wean yourself off all pain medications as you are able.  - Some pain medications contain both tylenol (acetaminophen) and a narcotic (Norco, vicodin, percocet), do not take more than 4,000mg of Tylenol (acetaminophen) from all sources in any 24 hour period.  - Narcotics can make you constipated.  Take over the counter fiber (metamucil or benefiber) and stool softeners (miralax, docusate or senna) while taking narcotic pain medications, but stop if you develop diarrhea.  - No driving or operating machinery while taking narcotic pain medications            Wound care and dressings       Incisions (if present)  - You may shower and get incisions wet starting 48 hrs after surgery.  - Do not scrub incisions or submerge wounds for 2 weeks or until seen in follow-up.   - Remove wound dressing 48 hours after surgery.   - If purple dermabond glue was used, avoid applying any lotions or ointments.   - If steri-strips were used, they will fall off on their own.   - Leave incision open to air. Cover with gauze only if needed for comfort or to protect clothing from drainage.   - The stitches do not need to be removed, they will dissolve on their own.                  Follow-up Appointments     Adult [facility] Follow-up and recommended labs and tests       Follow-up:  -Return to clinic to see Dr. Burger on 7/7/17 as previously arranged.  " Contact our clinic using the numbers below if you have questions about this.    Phone numbers:   - Monday through Friday 8am to 4:30pm: Call [phone] with questions or to schedule or confirm appointment.    - Nights or weekends: call the after hours emergency pager - [phone] and tell the  \"I would like to page the Urology Resident on call.\"  - For emergencies, call 911                  Your next 10 appointments already scheduled     Jul 07, 2017 11:00 AM CDT   (Arrive by 10:45 AM)   Post-Op with Davon Burger MD   [facility] and [facility] ([facility])    [address] [place] [address]  4th Floor  [facility] [address] [phone]              Pending Results     Date and Time Order Name Status Description    6/21/2017 1241 Surgical pathology exam Preliminary             Statement of Approval     Ordered          06/22/17 1413  I have reviewed and agree with all the recommendations and orders detailed in this document.  EFFECTIVE NOW     Approved and electronically signed by:  Massimo Duval MD             Admission Information     Date & Time Provider Department Dept. Phone    6/21/2017 Davon Burger MD [address] [facility] [phone]      Your Vitals Were     Blood Pressure Pulse Temperature Respirations Height Weight    140/81 67 98.6  F (37  C) (Oral) 16 1.829 m (6') 92.5 kg (203 lb 14.8 oz)    Pulse Oximetry BMI (Body Mass Index)                100% 27.66 kg/m2          MyChart Information     Last 2 Left gives you secure access to your electronic health record. If you see a primary care provider, you can also send messages to your care team and make appointments. If you have questions, please call your primary care clinic.  If you do not have a primary care provider, please call [phone] and they will assist you.        Care EveryWhere ID     This is your Care EveryWhere ID. This could be used by other " organizations to access your [facility] medical records  [id number]        Equal Access to Services     CAMILAALLA GIFTY : Hadii tabitha Scott, quang velazco, qasaul kennedymasatnam blanton, eneida adorno. So [facility] [phone].    ATENCIÓN: Si habla español, tiene a morales disposición servicios gratuitos de asistencia lingüística. Llame al [phone].    We comply with applicable federal civil rights laws and [place] laws. We do not discriminate on the basis of race, color, national origin, age, disability sex, sexual orientation or gender identity.               Review of your medicines      START taking        Dose / Directions    oxyCODONE 5 MG IR tablet   Commonly known as:  ROXICODONE   Used for:  Malignant neoplasm of right kidney (H)        Dose:  5-10 mg   Take 1-2 tablets (5-10 mg) by mouth every 3 hours as needed for moderate to severe pain   Quantity:  30 tablet   Refills:  0       senna-docusate 8.6-50 MG per tablet   Commonly known as:  SENOKOT-S;PERICOLACE   Used for:  Malignant neoplasm of right kidney (H)        Dose:  1-2 tablet   Take 1-2 tablets by mouth 2 times daily   Quantity:  60 tablet   Refills:  0         CONTINUE these medicines which have NOT CHANGED        Dose / Directions    amLODIPine 10 MG tablet   Commonly known as:  NORVASC        Dose:  10 mg   Take 10 mg by mouth every morning   Refills:  0            Where to get your medicines      These medications were sent to [facility] [place], [place] - [address]  [address] [address]     Phone:  [phone]     senna-docusate 8.6-50 MG per tablet         Some of these will need a paper prescription and others can be bought over the counter. Ask your nurse if you have questions.     Bring a paper prescription for each of these medications     oxyCODONE 5 MG IR tablet                Protect others around you: Learn how to safely use, store and throw away your  medicines at www.disposemymeds.org.             Medication List: This is a list of all your medications and when to take them. Check marks below indicate your daily home schedule. Keep this list as a reference.      Medications           Morning Afternoon Evening Bedtime As Needed    amLODIPine 10 MG tablet   Commonly known as:  NORVASC   Take 10 mg by mouth every morning   Last time this was given:  10 mg on 6/22/2017  8:22 AM                                oxyCODONE 5 MG IR tablet   Commonly known as:  ROXICODONE   Take 1-2 tablets (5-10 mg) by mouth every 3 hours as needed for moderate to severe pain                                senna-docusate 8.6-50 MG per tablet   Commonly known as:  SENOKOT-S;PERICOLACE   Take 1-2 tablets by mouth 2 times daily   Last time this was given:  2 tablets on 6/22/2017  8:22 AM

## 2017-06-21 NOTE — IP AVS SNAPSHOT
[address] [address]    Phone:  [phone]                                       After Visit Summary   6/21/2017    Davon Hui    MRN: [id number]           After Visit Summary Signature Page     I have received my discharge instructions, and my questions have been answered. I have discussed any challenges I see with this plan with the nurse or doctor.    ..........................................................................................................................................  Patient/Patient Representative Signature      ..........................................................................................................................................  Patient Representative Print Name and Relationship to Patient    ..................................................               ................................................  Date                                            Time    ..........................................................................................................................................  Reviewed by Signature/Title    ...................................................              ..............................................  Date                                                            Time

## 2017-06-21 NOTE — ANESTHESIA PROCEDURE NOTES
Peripheral Nerve Block Procedure Note    Staff:     Anesthesiologist:  ERA COLLINS    Resident/CRNA:  MARY VASQUEZ    Block performed by resident/CRNA in the presence of a teaching physician    Location: OR AFTER induction  Procedure Start/Stop TImes:     patient identified, IV checked, site marked, risks and benefits discussed, informed consent, monitors and equipment checked, pre-op evaluation, at physician/surgeon's request and post-op pain management      Correct Patient: Yes      Correct Position: Yes      Correct Site: Yes      Correct Procedure: Yes      Correct Laterality:  Yes    Site Marked:  Yes  Procedure details:     Procedure:  TAP    Laterality:  Right    Insertion Site:  T9-10    Needle gauge:  21    Needle length (mm):  110    Ultrasound: Yes      Ultrasound used to identify targeted nerve, plexus, or vascular structure and placed a needle adjacent to it      Permanent Image entered into patiient's record      Abnormal pain on injection: No      Blood Aspirated: No      Paresthesias:  No    Bleeding at site: No      Bolus via:  Needle    Infusion Method:  Single Shot    Complications:  None

## 2017-06-21 NOTE — OP NOTE
DATE OF SERVICE: 06/21/17  PREOPERATIVE DIAGNOSIS: Right Renal Mass  POSTOPERATIVE DIAGNOSIS: Right Renal Mass  PROCEDURES PERFORMED: Right Laparoscopic Nephrectomy  STAFF SURGEON: CAROLYN Burger MD,  RESIDENT SURGEON: Bryon Elizabeth MD, Albert Braxton MD  ANESTHESIA:  General Anesthesia  ESTIMATED BLOOD LOSS: 80 mL.   IV FLUIDS: see dictated anesthesia record  COMPLICATIONS: None.   SPECIMEN:  Right Kidney   DRAINS AND TUBES:  16 Fr otero catheter  SIGNIFICANT FINDINGS:   Gross examination of the kidney showed renal mass without significant involvement of surrounding organs. Microscopic examination of renal vein margin showed no evidence for tumor thrombus involvement.  BRIEF OPERATIVE INDICATIONS: Davon Hui is a 67 year old year old male with a right renal mass.  He has decided to proceed with treatment. There is concern for renal vein involvement. He is aware of the risks and benefits of the procedure as well as options such as observation and alternative surgical treatment.  OPERATIIVE DETAILS: Informed consent was obtained and the patient was brought to the operating room where general anesthesia was induced. He was given appropriate preoperative antibiotics. He was initially positioned modified flank position where he was prepped and draped in the standard sterile fashion.  We were careful to pad all pressure points.  We then performed a timeout, verifying the correct patient's site and procedure to be performed.   We began by inserting the Veress needle into the abdomen. After confirmatory drop test, the abdomen was insufflated. We then proceeded to place three 12 mm non-dilating ports to triangulate the kidney. An additional 5 mm port was needed for retraction.    We mobilized the colon from the lateral aspect of the abdominal wall and reflect it medially. Gerota's fascia was then opened and the lower pole of the kidney mobilized. The gonadal vessels were identified and spared. The ureter was also  identified and divided with Hem-o-lock clips. We then moved cephalad to the renal hilum. We then identified the following vessels:  Renal Vein: 1 and Renal Artery: 1.  These were carefully dissected and freed from surrounding strctures. We divided the renal artery with a 45 mm Endo-MEENA stapler. A 30 mm x 2.5 mm load TA stapler was used to staple across the vein, with one load close to the IVC and a second close to the kidney. We used a laparoscopic scissors to incise between the staple loads and there was some concern for abnormal material within the lumen. This returned negative for malignancy on frozen section evaluation. We then continued to march up towards the upper pole where the remaining attachments were divided using cautery.  We were able to spare the adrenal.  We then continued to divide the lateral attachments until the kidney was completely free. At this point it was placed into a large EndoCatch bag. Hemostasis was obtained. The specimen was extracted through an upper midline incision and taken to pathology. We reconfirmed with additional frozen sections and visual inspection of the renal vein that there was no additional tumor thrombus involvement. The abdomen was irrigated. The midline incision was closed with #0 PDS for the fascia. The skin incisions were closed with 4-0 Monocryl. The wounds were dressed with Dermabond. The patient was then awakened and taken to the PACU in stable condition.

## 2017-06-21 NOTE — BRIEF OP NOTE
[facility], [place]    Brief Operative Note    Pre-operative diagnosis: Renal Mass   Post-operative diagnosis Same   Procedure: Procedure(s):  Right Laparoscopic Possible Nephrectomy - Wound Class: II-Clean Contaminated  Surgeon: Surgeon(s) and Role:     * Davon Burger MD - Primary     * Byron Elizabeth - Resident - Assisting     * Albert Zuniga MD - Resident - Assisting  Anesthesia: Combined General with Block   Estimated blood loss: Less than 100 ml  Drains: None  Specimens:   ID Type Source Tests Collected by Time Destination   A : Renal Vein Lumen Tissue Other SURGICAL PATHOLOGY EXAM Davon Burger MD 6/21/2017 12:40 PM    B : Right Kidney Tissue Kidney, Right SURGICAL PATHOLOGY EXAM Davon Burger MD 6/21/2017  1:28 PM      Findings:   Unremarkable laparoscopic right nephrectomy; Frozen margin negative  Complications: None.  Implants: None.

## 2017-06-21 NOTE — PLAN OF CARE
Vitals:    06/21/17 1645 06/21/17 1700 06/21/17 1715 06/21/17 1730   BP: 137/74 150/78 138/79 147/77   Resp: 16      Temp:       TempSrc:       SpO2: 96%   95%   Weight:       Height:         Patient transferred from PACU, post R. laparoscopic nephrectomy @1630. AO x4, VSS. SpO2 >95%, 2L NC. One midline incision has dressing on with some blood, and three lap sites are JEANNIE; no bleeding or drainage noted since transfer to this unit. Patient is tolerating clear liquid diet well, had low sodium chicken broth for dinner. Denies any nausea. Pain well controlled with scheduled Tylenol, gabapentin and Toradol. Muniz intact with adequate output. PIV on BUE, NS infusing @100mL/hr on LUE. On capnography. Continue with current POC.

## 2017-06-21 NOTE — ANESTHESIA PROCEDURE NOTES
Arterial Line Procedure Note  Staff:     Anesthesiologist:  EARL SCHROEDER    Resident/CRNA:  NELIA BILLINGSLEY    Arterial line performed by resident/CRNA in presence of a teaching physician    Location: In OR After Induction  Procedure Start/Stop Times:     patient identified, IV checked, site marked, risks and benefits discussed, informed consent, monitors and equipment checked, pre-op evaluation and at physician/surgeon's request      Correct Patient: Yes      Correct Position: Yes      Correct Site: Yes      Correct Procedure: Yes      Correct Laterality:  Yes    Site Marked:  Yes  Line Placement:     Procedure:  Arterial Line    Insertion Site:  Radial    Insertion laterality:  Left    Skin Prep: Chloraprep      Patient Prep: patient draped, mask, sterile gloves, hat and hand hygiene      Local skin infiltration:  None    Ultrasound Guided?: Yes      Artery evaluated via ultrasound confirming patency.   Using realtime imaging, the artery was punctured and the needle was observed entering the artery.      A permanent image is entered into patient's chart.      Catheter size:  20 gauge, 12 cm    Cath secured with: anchor securement device      Dressing:  Tegaderm    Complications:  None obvious    Arterial waveform: Yes      IBP within 10% of NIBP: Yes

## 2017-06-21 NOTE — LETTER
Transition Communication Hand-off for Care Transitions to Next Level of Care Provider    Name: Davon Hui  MRN #: [id number]  Primary Care Provider: Magali Yuan     Primary Clinic: [address]  [facility] [address]     Reason for Hospitalization:  Renal Mass   S/p nephrectomy  Admit Date/Time: 6/21/2017  7:30 AM  Discharge Date: 6/22/2017  Payor Source: Payor: BCBS / Plan: [place] [place] / Product Type: Indemnity /     Reason for Communication Hand-off Referral: Other continuity of care    Discharge Plan: Discharged to home with plan for clinic f/u       Follow-up plan:  Future Appointments  Date Time Provider Department Center   7/7/2017 11:00 AM Davon Burger MD [facility]       Andreia Doyle, RNCC  [phone]    AVS/Discharge Summary is the source of truth; this is a helpful guide for improved communication of patient story

## 2017-06-22 VITALS
RESPIRATION RATE: 16 BRPM | TEMPERATURE: 98.6 F | BODY MASS INDEX: 27.62 KG/M2 | WEIGHT: 203.93 LBS | HEIGHT: 72 IN | DIASTOLIC BLOOD PRESSURE: 81 MMHG | SYSTOLIC BLOOD PRESSURE: 140 MMHG | OXYGEN SATURATION: 100 % | HEART RATE: 67 BPM

## 2017-06-22 LAB
ANION GAP SERPL CALCULATED.3IONS-SCNC: 7 MMOL/L (ref 3–14)
BUN SERPL-MCNC: 22 MG/DL (ref 7–30)
CALCIUM SERPL-MCNC: 8.9 MG/DL (ref 8.5–10.1)
CHLORIDE SERPL-SCNC: 109 MMOL/L (ref 94–109)
CO2 SERPL-SCNC: 22 MMOL/L (ref 20–32)
CREAT SERPL-MCNC: 1.35 MG/DL (ref 0.66–1.25)
ERYTHROCYTE [DISTWIDTH] IN BLOOD BY AUTOMATED COUNT: 13.2 % (ref 10–15)
GFR SERPL CREATININE-BSD FRML MDRD: 53 ML/MIN/1.7M2
GLUCOSE SERPL-MCNC: 120 MG/DL (ref 70–99)
HCT VFR BLD AUTO: 40.1 % (ref 40–53)
HGB BLD-MCNC: 13.9 G/DL (ref 13.3–17.7)
MCH RBC QN AUTO: 31.3 PG (ref 26.5–33)
MCHC RBC AUTO-ENTMCNC: 34.7 G/DL (ref 31.5–36.5)
MCV RBC AUTO: 90 FL (ref 78–100)
PLATELET # BLD AUTO: 159 10E9/L (ref 150–450)
POTASSIUM SERPL-SCNC: 4.4 MMOL/L (ref 3.4–5.3)
RBC # BLD AUTO: 4.44 10E12/L (ref 4.4–5.9)
SODIUM SERPL-SCNC: 139 MMOL/L (ref 133–144)
WBC # BLD AUTO: 9.8 10E9/L (ref 4–11)

## 2017-06-22 PROCEDURE — 25000128 H RX IP 250 OP 636: Performed by: UROLOGY

## 2017-06-22 PROCEDURE — 25000132 ZZH RX MED GY IP 250 OP 250 PS 637: Mod: GY | Performed by: STUDENT IN AN ORGANIZED HEALTH CARE EDUCATION/TRAINING PROGRAM

## 2017-06-22 PROCEDURE — 25000128 H RX IP 250 OP 636: Performed by: STUDENT IN AN ORGANIZED HEALTH CARE EDUCATION/TRAINING PROGRAM

## 2017-06-22 PROCEDURE — 80048 BASIC METABOLIC PNL TOTAL CA: CPT | Performed by: STUDENT IN AN ORGANIZED HEALTH CARE EDUCATION/TRAINING PROGRAM

## 2017-06-22 PROCEDURE — 36415 COLL VENOUS BLD VENIPUNCTURE: CPT | Performed by: STUDENT IN AN ORGANIZED HEALTH CARE EDUCATION/TRAINING PROGRAM

## 2017-06-22 PROCEDURE — 85027 COMPLETE CBC AUTOMATED: CPT | Performed by: STUDENT IN AN ORGANIZED HEALTH CARE EDUCATION/TRAINING PROGRAM

## 2017-06-22 PROCEDURE — A9270 NON-COVERED ITEM OR SERVICE: HCPCS | Mod: GY | Performed by: STUDENT IN AN ORGANIZED HEALTH CARE EDUCATION/TRAINING PROGRAM

## 2017-06-22 RX ORDER — OXYCODONE HYDROCHLORIDE 5 MG/1
5-10 TABLET ORAL
Qty: 30 TABLET | Refills: 0 | Status: SHIPPED | OUTPATIENT
Start: 2017-06-22

## 2017-06-22 RX ORDER — LORAZEPAM 1 MG/1
1-4 TABLET ORAL EVERY 30 MIN PRN
Status: DISCONTINUED | OUTPATIENT
Start: 2017-06-22 | End: 2017-06-22 | Stop reason: HOSPADM

## 2017-06-22 RX ORDER — AMOXICILLIN 250 MG
1-2 CAPSULE ORAL 2 TIMES DAILY
Qty: 60 TABLET | Refills: 0 | Status: SHIPPED | OUTPATIENT
Start: 2017-06-22

## 2017-06-22 RX ADMIN — SENNOSIDES AND DOCUSATE SODIUM 2 TABLET: 8.6; 5 TABLET ORAL at 08:22

## 2017-06-22 RX ADMIN — ACETAMINOPHEN 975 MG: 325 TABLET, FILM COATED ORAL at 16:37

## 2017-06-22 RX ADMIN — AMLODIPINE BESYLATE 10 MG: 10 TABLET ORAL at 08:22

## 2017-06-22 RX ADMIN — HEPARIN SODIUM 5000 UNITS: 5000 INJECTION, SOLUTION INTRAVENOUS; SUBCUTANEOUS at 08:22

## 2017-06-22 RX ADMIN — GABAPENTIN 100 MG: 100 CAPSULE ORAL at 08:22

## 2017-06-22 RX ADMIN — ACETAMINOPHEN 975 MG: 325 TABLET, FILM COATED ORAL at 08:22

## 2017-06-22 RX ADMIN — SODIUM CHLORIDE: 9 INJECTION, SOLUTION INTRAVENOUS at 02:35

## 2017-06-22 RX ADMIN — HEPARIN SODIUM 5000 UNITS: 5000 INJECTION, SOLUTION INTRAVENOUS; SUBCUTANEOUS at 16:38

## 2017-06-22 RX ADMIN — KETOROLAC TROMETHAMINE 15 MG: 15 INJECTION, SOLUTION INTRAMUSCULAR; INTRAVENOUS at 08:22

## 2017-06-22 RX ADMIN — GABAPENTIN 100 MG: 100 CAPSULE ORAL at 15:06

## 2017-06-22 RX ADMIN — KETOROLAC TROMETHAMINE 15 MG: 15 INJECTION, SOLUTION INTRAMUSCULAR; INTRAVENOUS at 01:18

## 2017-06-22 ASSESSMENT — PAIN DESCRIPTION - DESCRIPTORS
DESCRIPTORS: SORE
DESCRIPTORS: SORE

## 2017-06-22 NOTE — PLAN OF CARE
Problem: Goal Outcome Summary  Goal: Goal Outcome Summary  /79 (BP Location: Left arm)  Pulse 67  Temp 98.2  F (36.8  C) (Oral)  Resp 16  Ht 1.829 m (6')  Wt 92.5 kg (203 lb 14.8 oz)  SpO2 94%  BMI 27.66 kg/m2      6811-9059: Tmax 99.3, currently afebrile; AOVSS on 2L O2 via NC w/ sats in mid-90s; on capnography, ETCO2 35, IPI 10. Denies nausea, SOB. Pain managed w/ scheduled toradol and gabapentin. Midline incision, covered w/ ABD dressing, small amount of dried drainage, no change this shift. Lap sites JEANNIE, CDI. Muniz intact, good urine output. No BM this shift. Tolerating clear liquids well. Continue to monitor and follow POC.

## 2017-06-22 NOTE — DISCHARGE SUMMARY
Discharge Summary     Davon Hui MRN# [id number]   YOB: 1950 Age: 67 year old     Date of Admission:  6/21/2017  Date of Discharge::  6/22/2017  Admitting Physician:  Davon Burger MD  Discharge Physician:  Massimo Duval MD  Primary Care Physician:         Magali Douglas          Admission Diagnoses:   Renal Mass           Discharge Diagnosis:   Clear cell renal carcinoma          Procedures:   6/21/17: Procedure(s):  Right Laparoscopic Possible Nephrectomy - Wound Class: II-Clean Contaminated        Non-operative procedures:   None performed          Consultations:   None         Imaging Studies:     Results for orders placed or performed in visit on 06/16/17   MR Abdomen w/o & w Contrast    Narrative    MRI ABDOMEN    CLINICAL HISTORY:  Right renal malignancy    TECHNIQUE:  Images were acquired with and without intravenous contrast  through the abdomen. The following MR images were acquired: TrueFISP,  multiplanar T2 weighted, axial T1 in/out of phase, axial fat-saturated  T1, diffusion-weighted. Multiplanar T1-weighted images with fat  saturation were before contrast administration and at multiple time  points following the administration of intravenous contrast. Contrast  dose: 8.5mL Gadavist    FINDINGS:    Comparison study: PET CT 5/15/2017, CT 5/4/2017    Liver: Within the left lobe of the liver, there is redemonstration of  a 3.9 x 3.2 x 3.5 cm lesion, not significantly changed in size since  the initial exam on 5/4/2017. This lesion demonstrates T2 hyperintense  signal, T1 hypointense signal, and discontinuous peripheral  enhancement that fills in on delayed phase imaging.    2 other smaller hepatic lesions are identified: A 1.6 x 0.9 cm lesion  in segment 6 (series 8, image 46), and a 0.3 x 0.3 cm focus within the  inferior aspect of segment 8 (series 8, image 35). Both of these  lesions are bright on T2-weighted imaging, dark on  T1-weighted  imaging, and match the blood pool following the administration of  gadolinium.    Within the dome liver, there is a ill-defined T2 hyperintense, T1  hypointense region which has ill-defined persistent hyperenhancement.  This region measures approximately 2.2 cm.    A few other tiny foci are noted on diffusion-weighted imaging which  are not well characterized on other sequences.    No intrahepatic biliary ductal dilatation. Marked diffuse signal  dropout on in phase imaging.    Gallbladder: No cholelithiasis. No gallbladder wall thickening. No  pericholecystic fluid.    Spleen: Unremarkable.    Kidneys: There is re-demonstration of a heterogeneously enhancing  right renal mass which protrudes into the renal pelvis and extends  into the right renal vein. It does not involve the IVC. There is a  single right renal artery. Single right renal vein and renal pelvis.  The mass measures approximately 8.0 x 4.5 x 4.4 cm. No other  suspicious renal lesions identified. No hydronephrosis or hydroureter.    Adrenal glands: Adrenals are unremarkable.    Pancreas: No focal pancreatic lesions.    Bowel: Visualized bowel is nondistended.    Lymph nodes: No significant retroperitoneal or mesenteric  lymphadenopathy.    Blood vessels: Abdominal vasculature is patent other than right renal  vein involvement by the primary tumor.    Lung bases: Lung bases are clear. Heart size is normal. No pericardial  effusion.    Bones and soft tissues: Hemangiomas within the L2 and T10 vertebral  bodies.    Mesentery and abdominal wall: Unremarkable.    Ascites: No ascites.      Impression    IMPRESSION:  1. Redemonstration of a primary renal malignancy involving the right  kidney, with extension into the right renal vein. No IVC involvement.  2. At least 3 lesions scattered throughout the liver are consistent  with hemangiomas.  3. Enhancing lesion within the posterior dome of the liver is  indeterminate, but favored to represent  inflammatory changes. Although  the appearance is atypical for metastasis, recommend follow-up exam in  3-6 months for further evaluation.  4. Diffuse iron deposition throughout the liver.    I have personally reviewed the examination and initial interpretation  and I agree with the findings.    DEMOND ABDI MD            Medications Prior to Admission:     Prescriptions Prior to Admission   Medication Sig Dispense Refill Last Dose     amLODIPine (NORVASC) 10 MG tablet Take 10 mg by mouth every morning    6/21/2017 at 0630            Discharge Medications:     Current Discharge Medication List      START taking these medications    Details   oxyCODONE (ROXICODONE) 5 MG IR tablet Take 1-2 tablets (5-10 mg) by mouth every 3 hours as needed for moderate to severe pain  Qty: 30 tablet, Refills: 0    Associated Diagnoses: Malignant neoplasm of right kidney (H)      senna-docusate (SENOKOT-S;PERICOLACE) 8.6-50 MG per tablet Take 1-2 tablets by mouth 2 times daily  Qty: 60 tablet, Refills: 0    Associated Diagnoses: Malignant neoplasm of right kidney (H)         CONTINUE these medications which have NOT CHANGED    Details   amLODIPine (NORVASC) 10 MG tablet Take 10 mg by mouth every morning                     Brief History of Illness:   Reason for admission requiring a surgical or invasive procedure:   Renal Mass    The patient underwent the following procedure(s):   See above   There were no immediate complications during this procedure.    Please refer to the full operative summary for details.           Hospital Course:   The patient's hospital course was unremarkable.  Davon Hui recovered as anticipated and experienced no post-operative complications.      On POD#1 patient was ambulating without assitance, tolerating the discharge diet, had pain controlled with PO medications to go home with, and requiring no IV medications or fluids. Patient was discharged home with appropriate contact information,  "follow-up and instructions as seen below in the discharge paperwork.       Final Pathology Result:   FINAL DIAGNOSIS:   A. Renal vein lumen, biopsy:   - Negative for carcinoma     B. Kidney, right, radical nephrectomy:   - Clear cell renal carcinoma   - ISUP tumor ndgndrndanddndend:nd nd2nd of 4   - Tumor size (maximum dimension): 7.2 cm   - Renal vein invasion: Identified   - Renal sinus invasion: Identified   - Sarcomatoid features and necrosis: Not identified   - Margins of resection: Free of carcinoma   - Pathological staging (pTNM):pT3a          Discharge Instructions and Follow-Up:     Discharge Procedure Orders  Reason for your hospital stay   Order Comments: You were in the hospital to have surgery on your kidney     Adult [facility] Follow-up and recommended labs and tests   Order Comments: Follow-up:  -Return to clinic to see Dr. Burger on 7/7/17 as previously arranged.  Contact our clinic using the numbers below if you have questions about this.    Phone numbers:   - Monday through Friday 8am to 4:30pm: Call [phone] with questions or to schedule or confirm appointment.    - Nights or weekends: call the after hours emergency pager - [phone] and tell the  \"I would like to page the Urology Resident on call.\"  - For emergencies, call [phone]     Activity   Order Comments: Activity  - No strenuous exercise for 6 weeks.  - No lifting, pushing, pulling more than 10 pounds for 6 weeks.   - Do not strain with bowel movements.  - Do not drive until you can press the brake pedal quickly and fully without pain.   - Do not operate a motor vehicle while taking narcotic pain medications (if prescribed)   Order Specific Question Answer Comments   Is discharge order? Yes      When to contact your care team   Order Comments: - Call or return sooner than your regularly scheduled visit if you develop any of the following: fever (greater than 101.5), uncontrolled pain, uncontrolled nausea or vomiting, as well as increased " redness, swelling, or drainage from your wound (if present).  Call or return to ER if you have blood in urine that is getting worse (rather than getting better) or if you are passing clots and unable to urinate     Wound care and dressings   Order Comments: Incisions (if present)  - You may shower and get incisions wet starting 48 hrs after surgery.  - Do not scrub incisions or submerge wounds for 2 weeks or until seen in follow-up.   - Remove wound dressing 48 hours after surgery.   - If purple dermabond glue was used, avoid applying any lotions or ointments.   - If steri-strips were used, they will fall off on their own.   - Leave incision open to air. Cover with gauze only if needed for comfort or to protect clothing from drainage.   - The stitches do not need to be removed, they will dissolve on their own.     Discharge Instructions   Order Comments: Medications  - Transition from narcotic pain medications to tylenol (acetaminophen) as you are able.  Wean yourself off all pain medications as you are able.  - Some pain medications contain both tylenol (acetaminophen) and a narcotic (Norco, vicodin, percocet), do not take more than 4,000mg of Tylenol (acetaminophen) from all sources in any 24 hour period.  - Narcotics can make you constipated.  Take over the counter fiber (metamucil or benefiber) and stool softeners (miralax, docusate or senna) while taking narcotic pain medications, but stop if you develop diarrhea.  - No driving or operating machinery while taking narcotic pain medications     Full Code     Diet   Order Comments: Resume previous diet upon discharge   Order Specific Question Answer Comments   Is discharge order? Yes               Discharge Disposition:   Discharged to Home      Condition at discharge: Good    --    Massimo Duval MD  Urology Resident    2:14 PM, 6/22/2017

## 2017-06-22 NOTE — PROGRESS NOTES
Urology Daily Progress Note    24 hour events/Subjective:     - No acute events overnight   - Pain well controlled on current regimen   - Tolerating clear liquid diet ; no nausea or vomiting   - Not yet passing flatus.   - Not yet ambulating.    O:  Vitals: /79 (BP Location: Left arm)  Pulse 67  Temp 98.2  F (36.8  C) (Oral)  Resp 16  Ht 1.829 m (6')  Wt 92.5 kg (203 lb 14.8 oz)  SpO2 94%  BMI 27.66 kg/m2  General: Alert, interactive, in NAD  Resp: Non-labored breathing on RA  Abdomen: Soft, appropriately-tender, non distended. Incisions c/d/i with dermabond  Ext: Warm and well perfused   Muniz/Urostomy: Clear yellow      I/O last 3 completed shifts:  In: 2445 [P.O.:240; I.V.:1955]  Out: 1845 [Urine:1545; Blood:300] - Last 24 hours      Labs/Imaging  Heme:  Recent Labs  Lab 06/21/17  1759 06/21/17  1447 06/21/17  1256 06/21/17  1124   WBC  --  12.0*  --   --    HGB  --  14.1 14.8 15.1    165  --   --      Chem:  Recent Labs  Lab 06/21/17  1447 06/21/17  1256 06/21/17  1124   POTASSIUM 4.0 4.4 4.3   CR 1.14  --   --        Assessment/Plan  67 year old y/o male POD#1  s/p R radical lap nephrectomy. Doing well.    NEURO Pain well controlled on  APAP with PRN Oxycodone and IV Dilaudid ; Alcohol withdrawal protocol ordered  Changes:  None    CV HDS.    PULM Aggressive pulmonary toilet and I/S;    FEN/GI Advance to regular diet; SLIV    TOV this AM   HEME Follow up AM labs   ID Afebrile, follow up AM labs      ENDO No issues   ACTIVITY OOB   PPx Heparin 5000 U TID   DISPO 7B, anticipated d/c to home later today         Seen and examined with chief resident, to be discussed with Dr. Burger    --    Massimo Duval MD  Urology Resident               Contacting the Urology Team     Please use the following job codes to reach the Urology Team. Note that you must use an in house phone and that job codes cannot receive text pages.     On weekdays, dial [phone] (or star-star-star [phone] on the new [place]  telephones) then [phone] to reach the Adult Urology resident or PA on call    On weekdays, dial [phone] (or star-star-star [phone] on the new [employer] telephones) then [phone] to reach the Pediatric Urology resident    On weeknights and weekends, dial [phone] (or star-star-star [phone] on the new [employer] telephones) then [phone] to reach the Urology resident on call (for both Adult and Pediatrics)

## 2017-06-22 NOTE — PLAN OF CARE
Problem: Goal Outcome Summary  Goal: Goal Outcome Summary  Outcome: Adequate for Discharge Date Met:  06/22/17  Vitals:     06/22/17 0825 06/22/17 1200 06/22/17 1400 06/22/17 1630   BP: 137/75     140/81   BP Location: Left arm         Pulse:           Resp: 16     16   Temp: 97.5  F (36.4  C)     98.6  F (37  C)   TempSrc: Oral     Oral   SpO2: 95% 97% 98% 100%   Weight:           Height:           Pt stable expressed readiness to go home. MD approved dc orders and revised by this staff nurse. Pt and spouse verbalized understanding. Scheduled meds given before discharge.  PIV removed. Pt escorted by his family in a wheel chair.

## 2017-06-22 NOTE — PLAN OF CARE
Problem: Goal Outcome Summary  Goal: Goal Outcome Summary  Outcome: No Change  /79 (BP Location: Left arm)  Pulse 67  Temp 98.2  F (36.8  C) (Oral)  Resp 16  Ht 1.829 m (6')  Wt 92.5 kg (203 lb 14.8 oz)  SpO2 94%  BMI 27.66 kg/m2     0819-6047: VSS, Afebrile.  Pt calm and cooperative overnight.  Had one cup of broth overnight, tolerated well.  No reports of nausea, SOB or pain. Pt up sitting on the side of the bed x1 overnight.  Able to tolerate.  Lap sites, C/D/I, JEANNIE.  Midline with abd, dried small amount of drainage present.  Muniz patent with adequate urine output.  BS+, no BM, no gas.  Pt on 1.5L of O2 for mid to high 90 sat. Pt otherwise comfortable, continue with POC.

## 2017-06-23 ENCOUNTER — CARE COORDINATION (OUTPATIENT)
Dept: CARE COORDINATION | Facility: CLINIC | Age: 67
End: 2017-06-23

## 2017-06-23 NOTE — PROGRESS NOTES
"[facility]  \"Hello, my name is Estee Garcia , and I am calling from the [facility].  I want to check in and see how you are doing, after leaving the hospital.  You may also receive a call from your Care Coordinator (care team), but I want to make sure you don t have any urgent needs.  I have a couple questions to review with you:     Post-Discharge Outreach                                                    Davon Hui is a 67 year old male        Follow-up Appointments           Adult [facility] Follow-up and recommended labs and tests         Follow-up:  -Return to clinic to see Dr. Burger on 7/7/17 as previously arranged.  Contact our clinic using the numbers below if you have questions about this.     Phone numbers:   - Monday through Friday 8am to 4:30pm: Call [phone] with questions or to schedule or confirm appointment.    - Nights or weekends: call the after hours emergency pager - [phone] and tell the  \"I would like to page the Urology Resident on call.\"  - For emergencies, call 911                        Your next 10 appointments already scheduled            Jul 07, 2017 11:00 AM CDT   (Arrive by 10:45 AM)   Post-Op with Davon Burger MD   [facility] ([facility])     [address] [address] [phone]                      Care Team:    Patient Care Team       Relationship Specialty Notifications Start End    Magali Douglas, NP PCP - General Family Practice  5/23/17     Phone: [phone] Fax: [phone]         [facility] MEDICINE [address] [facility] [address]    Shelby Gordon Referring Physician Hematology & Oncology  5/23/17     Phone: [phone] Fax: [phone]         [facility] [address] [place] [address]    Davon Burger MD MD Urology  " 6/9/17     Phone: [phone] Fax: [phone]         [place] [id number] [facility] [address]    Eileen Michel, RN Registered Nurse Urology  6/9/17     Magali Douglas, NP Referring Physician Family Practice  6/9/17     Phone: [phone] Fax: [phone]         [facility] [address] [facility] [address]            Transition of Care Review                                                      Patient was called three times and no answer so post 24 hr DC follow up calls will be closed out, message was left with contact number for department seen by or following up with                    Plan                                                      Thanks for your time.  Your Care Coordinator may follow-up within the next couple days.  In the meantime if you have questions, concerns or problems call your care team.        Estee Garcia

## 2017-06-25 LAB
BLD PROD TYP BPU: NORMAL
BLD PROD TYP BPU: NORMAL
BLD UNIT ID BPU: 0
BLD UNIT ID BPU: 0
BLOOD PRODUCT CODE: NORMAL
BLOOD PRODUCT CODE: NORMAL
BPU ID: NORMAL
BPU ID: NORMAL
TRANSFUSION STATUS PATIENT QL: NORMAL

## 2017-06-27 LAB — COPATH REPORT: NORMAL

## 2017-06-30 ENCOUNTER — PRE VISIT (OUTPATIENT)
Dept: UROLOGY | Facility: CLINIC | Age: 67
End: 2017-06-30

## 2017-06-30 NOTE — TELEPHONE ENCOUNTER
Post op.  S/P Right Laparoscopic Nephrectomy on 6-21-17.  Records available in [facility].

## 2017-07-07 ENCOUNTER — OFFICE VISIT (OUTPATIENT)
Dept: UROLOGY | Facility: CLINIC | Age: 67
End: 2017-07-07

## 2017-07-07 VITALS
DIASTOLIC BLOOD PRESSURE: 87 MMHG | HEIGHT: 72 IN | WEIGHT: 190.6 LBS | SYSTOLIC BLOOD PRESSURE: 157 MMHG | HEART RATE: 67 BPM | BODY MASS INDEX: 25.81 KG/M2

## 2017-07-07 DIAGNOSIS — C64.1 MALIGNANT NEOPLASM OF RIGHT KIDNEY (H): Primary | ICD-10-CM

## 2017-07-07 LAB
ANION GAP SERPL CALCULATED.3IONS-SCNC: 7 MMOL/L (ref 3–14)
BUN SERPL-MCNC: 29 MG/DL (ref 7–30)
CALCIUM SERPL-MCNC: 9.3 MG/DL (ref 8.5–10.1)
CHLORIDE SERPL-SCNC: 104 MMOL/L (ref 94–109)
CO2 SERPL-SCNC: 27 MMOL/L (ref 20–32)
CREAT SERPL-MCNC: 1.46 MG/DL (ref 0.66–1.25)
GFR SERPL CREATININE-BSD FRML MDRD: 48 ML/MIN/1.7M2
GLUCOSE SERPL-MCNC: 106 MG/DL (ref 70–99)
POTASSIUM SERPL-SCNC: 4.4 MMOL/L (ref 3.4–5.3)
SODIUM SERPL-SCNC: 137 MMOL/L (ref 133–144)

## 2017-07-07 ASSESSMENT — PAIN SCALES - GENERAL: PAINLEVEL: NO PAIN (0)

## 2017-07-07 NOTE — NURSING NOTE
Chief Complaint   Patient presents with     Surgical Followup     Post op.  S/P Right Laparoscopic Nephrectomy on 6-21-17.        Initial /87 (BP Location: Right arm, Patient Position: Chair, Cuff Size: Adult Regular)  Pulse 67  Ht 1.829 m (6')  Wt 86.5 kg (190 lb 9.6 oz)  BMI 25.85 kg/m2 Estimated body mass index is 25.85 kg/(m^2) as calculated from the following:    Height as of this encounter: 1.829 m (6').    Weight as of this encounter: 86.5 kg (190 lb 9.6 oz).  Medication Reconciliation: complete     Miguelina Wylie, student

## 2017-07-07 NOTE — LETTER
7/7/2017       RE: Lyric Hui  [address] [facility] [address]     Dear Colleague,    Thank you for referring your patient, Lyric Hui, to the [facility] UROLOGY AND [facility] FOR PROSTATE AND UROLOGIC CANCERS at [facility]. Please see a copy of my visit note below.    ASSESSMENT AND PLAN  Stage T3a Renal Cell Carcinoma (clear cell subtype) status post right laparoscopic nephrectomy on 6/21/17.  Margins were negative.  Renal vein invasion was present.    I will schedule a follow-up appointment in 3 months with CT Chest Abdomen and Pelvis with and without contrast.  I will contact him oncologist at [facility].    Surveillance Protocol For Renal Cell Carcinoma Following Nephrectomy and Partial nephrectomy (Margins Negative)  Stage 1    Abdominal CT 6-12 months after surgery.      Annual abdominal CT and Chest Xray for 3 years after surgery.    Follow-up after 3 years only as clinically indicated.    Stage 2 and 3 (High Risk)    CT Chest Abdomen and Pelvis with and without contrast at 3-6 months after surgery.     CT Chest Abdomen and Pelvis with and without contrast every 6 months for 3 years after surgery.    Annual CT Chest Abdomen and Pelvis with and without contrast until 5 years after surgery.    AUA Guideline: Renal Mass Follow-up for Clinically Localized Renal Neoplasms  _________________________________________________________    CHIEF COMPLAINT   Lyric Hui  is a 67 year old male here for a post operative visit following right laparoscopic nephrectomy.    HPI  Overall his postoperative has been going well.      Pathology  Collected: 6/21/2017   Received: 6/21/2017   Reported: 6/27/2017 16:11   Ordering Phy(s): LYRIC ODOM     For improved result formatting, select 'View Enhanced Report Format'   under Linked Documents section.     ORIGINAL REPORT:     SPECIMEN(S):   A: Renal vein lumen   B: Kidney, right     FINAL DIAGNOSIS:   A. Renal vein  lumen, biopsy:   - Negative for carcinoma     B. Kidney, right, radical nephrectomy:   - Clear cell renal carcinoma   - ISUP tumor thgthrthathdtheth:th th4th of 4   - Tumor size (maximum dimension): 7.2 cm   - Renal vein invasion: Identified   - Renal sinus invasion: Identified   - Sarcomatoid features and necrosis: Not identified   - Margins of resection: Free of carcinoma   - Pathological staging (pTNM):pT3a     COMMENT:   Immunohistochemical stains for CAIX and PAX8 on part A and B1 are   negative for carcinoma while CD68 highlights the histiocytes in these   sections.     The final diagnosis confirms the interpretation provided   intraoperatively.     Report Name: Kidney - Nephrectomy, Partial or Radical   Status: Submitted     Part(s) Involved:   B: Kidney, right     Synoptic Report:     SPECIMEN     Procedure:         - Radical nephrectomy     Specimen Laterality:         - Right     Tumor Site:         - Upper pole         - Middle     Tumor Focality:         - Unifocal     Macroscopic Extent of Tumor:         - Tumor extension into renal sinus         - Tumor extension into major veins     TUMOR     Histologic Type:         - Clear cell renal cell carcinoma     Sarcomatoid Features:         - Not identified     Histologic Grade (International Society of Urological Pathology   Grading     System):         - G3     EXTENT     Tumor Size: 7.2 cm     Microscopic Tumor Extension:         - Tumor extension into renal sinus         - Tumor extension into major veins       Vena Cava Involvement:           - No Vena Cava Involvement     MARGINS     Margins:         - Margins uninvolved by invasive carcinoma     ACCESSORY FINDINGS     Tumor Necrosis:         - Not identified     Lymph-Vascular Invasion:         - Not identified     LYMPH NODES     Lymph Nodes: No nodes submitted or found     STAGE (PTNM)     Primary Tumor (pT):         - pT3a: Tumor grossly extends into the renal vein or its segmental   (muscle         containing)  branches, or tumor invades perirenal and / or renal   sinus fat         but not beyond Gerota's fascia     Regional Lymph Nodes (pN):         - pNX: Regional lymph nodes cannot be assessed     Distant Metastasis (pM):         - Not applicable     ADDITIONAL NON-TUMOR     Pathologic Findings in Nonneoplastic Kidney:         - None identified     Patient Active Problem List    Diagnosis Date Noted     S/p nephrectomy 06/21/2017     Priority: Medium     Malignant neoplasm of right kidney (H) 06/20/2017     Priority: Medium     Essential hypertension 05/03/2017     Priority: Medium     Gross hematuria 05/03/2017     Priority: Medium     Past Medical History:   Diagnosis Date     HTN (hypertension)      Liver hemangioma      Renal mass, right      Past Surgical History:   Procedure Laterality Date     LAPAROSCOPIC NEPHRECTOMY Right 6/21/2017    Procedure: LAPAROSCOPIC NEPHRECTOMY;  Right Laparoscopic Nephrectomy;  Surgeon: Davon Burger MD;  Location: [facility] OR     Current Outpatient Prescriptions   Medication Sig Dispense Refill     amLODIPine (NORVASC) 10 MG tablet Take 10 mg by mouth every morning        oxyCODONE (ROXICODONE) 5 MG IR tablet Take 1-2 tablets (5-10 mg) by mouth every 3 hours as needed for moderate to severe pain (Patient not taking: Reported on 7/7/2017) 30 tablet 0     senna-docusate (SENOKOT-S;PERICOLACE) 8.6-50 MG per tablet Take 1-2 tablets by mouth 2 times daily (Patient not taking: Reported on 7/7/2017) 60 tablet 0      SOCIAL HISTORY: He  reports that he has quit smoking. His smoking use included Cigarettes. He has a 1.50 pack-year smoking history. He quit smokeless tobacco use about 45 years ago. He reports that he drinks about 12.6 oz of alcohol per week  He reports that he does not use illicit drugs.    PHYSICAL EXAM  /87 (BP Location: Right arm, Patient Position: Chair, Cuff Size: Adult Regular)  Pulse 67  Ht 1.829 m (6')  Wt 86.5 kg (190 lb 9.6 oz)  BMI 25.85  kg/m2  Constitutional: Alert, no acute distress  Psychiatric: Normal mood and affect  Abdomen: Abdomen soft, non-tender. No masses, organomegaly, or lymphadenopathy.   Surgical sites are healing well.    Recent Labs   Lab Test  06/22/17   0714  06/21/17   1759  06/21/17   1447  06/21/17   1256  06/21/17   1124  06/09/17   1125   WBC  9.8   --   12.0*   --    --   5.8   HGB  13.9   --   14.1  14.8  15.1  15.9   HCT  40.1   --   39.9*   --    --   45.7   PLT  159  157  165   --    --   183     Recent Labs   Lab Test  06/22/17   0714  06/21/17   1447  06/21/17   1256  06/21/17   1124  06/09/17   1125   NA  139  140  137  138  140   POTASSIUM  4.4  4.0  4.4  4.3  4.2   CHLORIDE  109  108   --    --   106   CO2  22  25   --    --   26   ANIONGAP  7  7   --    --   8   GLC  120*  182*  193*  179*  118*   BUN  22  24   --    --   23   CR  1.35*  1.14   --    --   0.98   GFRESTIMATED  53*  64   --    --   76   GFRESTBLACK  64  78   --    --   >90   GFR Calc     YARITZA  8.9  8.9   --    --   9.5     Recent Labs   Lab Test  06/09/17   1131   COLOR  Yellow   APPEARANCE  Clear   URINEGLC  Negative   URINEBILI  Negative   URINEKETONE  Negative   SG  1.019   UBLD  Negative   URINEPH  6.0   PROTEIN  Negative   NITRITE  Negative   LEUKEST  Negative   RBCU  1   WBCU  1       I, Aleksandr Burger, reviewed these laboratory values.      CC  Patient Care Team:  Magali Douglas NP as PCP - General (Family Practice)  Shelby Gordon as Referring Physician (Hematology & Oncology)  Eileen Michel, RN as Registered Nurse (Urology)    Copy to patient  LYRIC DANG  [address] [facility] [address]    Again, thank you for allowing me to participate in the care of your patient.      Sincerely,    Lyric Burger MD

## 2017-07-07 NOTE — PATIENT INSTRUCTIONS
Follow up with Dr. Burger as needed.    It was a pleasure meeting with you today.  Thank you for allowing me and my team the privilege of caring for you today.  YOU are the reason we are here, and I truly hope we provided you with the excellent service you deserve.  Please let us know if there is anything else we can do for you so that we can be sure you are leaving completely satisfied with your care experience.      BOBBY Greer

## 2017-07-07 NOTE — PROGRESS NOTES
ASSESSMENT AND PLAN  Stage T3a Renal Cell Carcinoma (clear cell subtype) status post right laparoscopic nephrectomy on 6/21/17.  Margins were negative.  Renal vein invasion was present.    I will schedule a follow-up appointment in 3 months with CT Chest Abdomen and Pelvis with and without contrast.  I will contact him oncologist at [facility].    Surveillance Protocol For Renal Cell Carcinoma Following Nephrectomy and Partial nephrectomy (Margins Negative)  Stage 1    Abdominal CT 6-12 months after surgery.      Annual abdominal CT and Chest Xray for 3 years after surgery.    Follow-up after 3 years only as clinically indicated.    Stage 2 and 3 (High Risk)    CT Chest Abdomen and Pelvis with and without contrast at 3-6 months after surgery.     CT Chest Abdomen and Pelvis with and without contrast every 6 months for 3 years after surgery.    Annual CT Chest Abdomen and Pelvis with and without contrast until 5 years after surgery.    AUA Guideline: Renal Mass Follow-up for Clinically Localized Renal Neoplasms  _________________________________________________________      CHIEF COMPLAINT   Lyric Hui  is a 67 year old male here for a post operative visit following right laparoscopic nephrectomy.    HPI  Overall his postoperative has been going well.      Pathology  Collected: 6/21/2017   Received: 6/21/2017   Reported: 6/27/2017 16:11   Ordering Phy(s): LYRIC ODOM     For improved result formatting, select 'View Enhanced Report Format'   under Linked Documents section.     ORIGINAL REPORT:     SPECIMEN(S):   A: Renal vein lumen   B: Kidney, right     FINAL DIAGNOSIS:   A. Renal vein lumen, biopsy:   - Negative for carcinoma     B. Kidney, right, radical nephrectomy:   - Clear cell renal carcinoma   - ISUP tumor ndgndrndanddndend:nd nd2nd of 4   - Tumor size (maximum dimension): 7.2 cm   - Renal vein invasion: Identified   - Renal sinus invasion: Identified   - Sarcomatoid features and necrosis: Not identified   -  Margins of resection: Free of carcinoma   - Pathological staging (pTNM):pT3a     COMMENT:   Immunohistochemical stains for CAIX and PAX8 on part A and B1 are   negative for carcinoma while CD68 highlights the histiocytes in these   sections.     The final diagnosis confirms the interpretation provided   intraoperatively.     Report Name: Kidney - Nephrectomy, Partial or Radical   Status: Submitted     Part(s) Involved:   B: Kidney, right     Synoptic Report:     SPECIMEN     Procedure:         - Radical nephrectomy     Specimen Laterality:         - Right     Tumor Site:         - Upper pole         - Middle     Tumor Focality:         - Unifocal     Macroscopic Extent of Tumor:         - Tumor extension into renal sinus         - Tumor extension into major veins     TUMOR     Histologic Type:         - Clear cell renal cell carcinoma     Sarcomatoid Features:         - Not identified     Histologic Grade (International Society of Urological Pathology   Grading     System):         - G3     EXTENT     Tumor Size: 7.2 cm     Microscopic Tumor Extension:         - Tumor extension into renal sinus         - Tumor extension into major veins       Vena Cava Involvement:           - No Vena Cava Involvement     MARGINS     Margins:         - Margins uninvolved by invasive carcinoma     ACCESSORY FINDINGS     Tumor Necrosis:         - Not identified     Lymph-Vascular Invasion:         - Not identified     LYMPH NODES     Lymph Nodes: No nodes submitted or found     STAGE (PTNM)     Primary Tumor (pT):         - pT3a: Tumor grossly extends into the renal vein or its segmental   (muscle         containing) branches, or tumor invades perirenal and / or renal   sinus fat         but not beyond Gerota's fascia     Regional Lymph Nodes (pN):         - pNX: Regional lymph nodes cannot be assessed     Distant Metastasis (pM):         - Not applicable     ADDITIONAL NON-TUMOR     Pathologic Findings in Nonneoplastic Kidney:          - None identified     Patient Active Problem List    Diagnosis Date Noted     S/p nephrectomy 06/21/2017     Priority: Medium     Malignant neoplasm of right kidney (H) 06/20/2017     Priority: Medium     Essential hypertension 05/03/2017     Priority: Medium     Gross hematuria 05/03/2017     Priority: Medium     Past Medical History:   Diagnosis Date     HTN (hypertension)      Liver hemangioma      Renal mass, right      Past Surgical History:   Procedure Laterality Date     LAPAROSCOPIC NEPHRECTOMY Right 6/21/2017    Procedure: LAPAROSCOPIC NEPHRECTOMY;  Right Laparoscopic Nephrectomy;  Surgeon: Davon Burger MD;  Location: [facility] OR     Current Outpatient Prescriptions   Medication Sig Dispense Refill     amLODIPine (NORVASC) 10 MG tablet Take 10 mg by mouth every morning        oxyCODONE (ROXICODONE) 5 MG IR tablet Take 1-2 tablets (5-10 mg) by mouth every 3 hours as needed for moderate to severe pain (Patient not taking: Reported on 7/7/2017) 30 tablet 0     senna-docusate (SENOKOT-S;PERICOLACE) 8.6-50 MG per tablet Take 1-2 tablets by mouth 2 times daily (Patient not taking: Reported on 7/7/2017) 60 tablet 0      SOCIAL HISTORY: He  reports that he has quit smoking. His smoking use included Cigarettes. He has a 1.50 pack-year smoking history. He quit smokeless tobacco use about 45 years ago. He reports that he drinks about 12.6 oz of alcohol per week  He reports that he does not use illicit drugs.    PHYSICAL EXAM  /87 (BP Location: Right arm, Patient Position: Chair, Cuff Size: Adult Regular)  Pulse 67  Ht 1.829 m (6')  Wt 86.5 kg (190 lb 9.6 oz)  BMI 25.85 kg/m2  Constitutional: Alert, no acute distress  Psychiatric: Normal mood and affect  Abdomen: Abdomen soft, non-tender. No masses, organomegaly, or lymphadenopathy.   Surgical sites are healing well.    Recent Labs   Lab Test  06/22/17   0714  06/21/17   1759  06/21/17   1447  06/21/17   1256  06/21/17   1124  06/09/17   1125   WBC  9.8    --   12.0*   --    --   5.8   HGB  13.9   --   14.1  14.8  15.1  15.9   HCT  40.1   --   39.9*   --    --   45.7   PLT  159  157  165   --    --   183     Recent Labs   Lab Test  06/22/17   0714  06/21/17   1447  06/21/17   1256  06/21/17   1124  06/09/17   1125   NA  139  140  137  138  140   POTASSIUM  4.4  4.0  4.4  4.3  4.2   CHLORIDE  109  108   --    --   106   CO2  22  25   --    --   26   ANIONGAP  7  7   --    --   8   GLC  120*  182*  193*  179*  118*   BUN  22  24   --    --   23   CR  1.35*  1.14   --    --   0.98   GFRESTIMATED  53*  64   --    --   76   GFRESTBLACK  64  78   --    --   >90   GFR Calc     YARITZA  8.9  8.9   --    --   9.5     Recent Labs   Lab Test  06/09/17   1131   COLOR  Yellow   APPEARANCE  Clear   URINEGLC  Negative   URINEBILI  Negative   URINEKETONE  Negative   SG  1.019   UBLD  Negative   URINEPH  6.0   PROTEIN  Negative   NITRITE  Negative   LEUKEST  Negative   RBCU  1   WBCU  1       IAleksandr, reviewed these laboratory values.      CC  Patient Care Team:  Taqueria Foley NP as PCP - General (Family Practice)  Shelby Gordon as Referring Physician (Hematology & Oncology)  Lyric Burger MD as MD (Urology)  Eileen Michel, RN as Registered Nurse (Urology)  Taqueria Foley, NP as Referring Physician (Family Practice)  TAQUERIA FOLEY    Copy to patient  LYRIC BROWN LAURENCE  [address] [facility] [address]

## 2017-07-07 NOTE — MR AVS SNAPSHOT
After Visit Summary   7/7/2017    Davon Hui    MRN: [id number]           Patient Information     Date Of Birth          1950        Visit Information        Provider Department      7/7/2017 11:00 AM Davon Burger MD [facility] Urology and [facility] for Prostate and Urologic Cancers        Today's Diagnoses     Malignant neoplasm of right kidney (H)    -  1       Follow-ups after your visit        Follow-up notes from your care team     Return if symptoms worsen or fail to improve.      Who to contact     Please call your clinic at [phone] to:    Ask questions about your health    Make or cancel appointments    Discuss your medicines    Learn about your test results    Speak to your doctor   If you have compliments or concerns about an experience at your clinic, or if you wish to file a complaint, please contact [facility] Patient Relations at [phone] or email us at [email or web address]         Additional Information About Your Visit        MyChart Information     [employer] gives you secure access to your electronic health record. If you see a primary care provider, you can also send messages to your care team and make appointments. If you have questions, please call your primary care clinic.  If you do not have a primary care provider, please call [phone] and they will assist you.      [employer] (d.b.a. [employer]) is an electronic gateway that provides easy, online access to your medical records. With [employer] ([employer]), you can request a clinic appointment, read your test results, renew a prescription or communicate with your care team.     To access your existing account, please contact your [facility] Clinic or call [phone] for assistance.        Care EveryWhere ID     This is your Care EveryWhere ID. This could be used by other organizations to access your [place] medical records  [id number]        Your Vitals Were     Pulse Height BMI  (Body Mass Index)             67 1.829 m (6') 25.85 kg/m2          Blood Pressure from Last 3 Encounters:   07/07/17 157/87   06/22/17 140/81   06/09/17 161/90    Weight from Last 3 Encounters:   07/07/17 86.5 kg (190 lb 9.6 oz)   06/21/17 92.5 kg (203 lb 14.8 oz)   06/09/17 78.2 kg (172 lb 6.4 oz)              We Performed the Following     Basic metabolic panel        Primary Care Provider Office Phone # Fax #    Magali Douglas [phone]       [facility] [address]  [facility] [address]        Equal Access to Services     LIU DURBIN : Hadii tabitha ulloa hadasho Soomaali, waaxda luqadaha, qaybta kaalmada adeegyada, eneida fletcherin romina acosta . So [facility] [phone].    ATENCIÓN: Si habla español, tiene a morales disposición servicios gratuitos de asistencia lingüística. Llame al [phone].    We comply with applicable federal civil rights laws and [place] laws. We do not discriminate on the basis of race, color, national origin, age, disability sex, sexual orientation or gender identity.            Thank you!     Thank you for choosing [facility] UROLOGY AND [facility] FOR PROSTATE AND UROLOGIC CANCERS  for your care. Our goal is always to provide you with excellent care. Hearing back from our patients is one way we can continue to improve our services. Please take a few minutes to complete the written survey that you may receive in the mail after your visit with us. Thank you!             Your Updated Medication List - Protect others around you: Learn how to safely use, store and throw away your medicines at www.disposemymeds.org.          This list is accurate as of: 7/7/17 12:10 PM.  Always use your most recent med list.                   Brand Name Dispense Instructions for use Diagnosis    amLODIPine 10 MG tablet    NORVASC     Take 10 mg by mouth every morning        oxyCODONE 5 MG IR tablet    ROXICODONE    30 tablet    Take 1-2 tablets (5-10 mg) by mouth  every 3 hours as needed for moderate to severe pain    Malignant neoplasm of right kidney (H)       senna-docusate 8.6-50 MG per tablet    SENOKOT-S;PERICOLACE    60 tablet    Take 1-2 tablets by mouth 2 times daily    Malignant neoplasm of right kidney (H)

## 2020-03-10 ENCOUNTER — HEALTH MAINTENANCE LETTER (OUTPATIENT)
Age: 70
End: 2020-03-10

## 2020-12-27 ENCOUNTER — HEALTH MAINTENANCE LETTER (OUTPATIENT)
Age: 70
End: 2020-12-27

## 2021-04-24 ENCOUNTER — HEALTH MAINTENANCE LETTER (OUTPATIENT)
Age: 71
End: 2021-04-24

## 2021-10-09 ENCOUNTER — HEALTH MAINTENANCE LETTER (OUTPATIENT)
Age: 71
End: 2021-10-09

## 2022-05-21 ENCOUNTER — HEALTH MAINTENANCE LETTER (OUTPATIENT)
Age: 72
End: 2022-05-21

## 2022-09-13 NOTE — PLAN OF CARE
Problem: Discharge Planning  Goal: Discharge Planning (Adult, OB, Behavioral, Peds)  A&Ox4. AVSS. Pt reported that abdominal incision pain is controlled with scheduled pain med. Pt ate regular diet for breakfast and lunch and tolerated, no c/o nausea. Muniz was removed by urology MD at 8:40 am and pt was able to void afterwards. Pt walked in [place] with stand by assist. Pt needs discharge paper work to be revised and discharge paper work to be given to pt. Pt needs to pick discharge meds from discharge pharmacy. Pt stated that his wife will come after 4 pm to give him ride home.        no

## 2022-09-17 ENCOUNTER — HEALTH MAINTENANCE LETTER (OUTPATIENT)
Age: 72
End: 2022-09-17

## 2023-06-03 ENCOUNTER — HEALTH MAINTENANCE LETTER (OUTPATIENT)
Age: 73
End: 2023-06-03

## (undated) DEVICE — DRAPE U SPLIT 74X120" 29440

## (undated) DEVICE — SU SILK 3-0 TIE 12X30" A304H

## (undated) DEVICE — SOL NACL 0.9% INJ 1000ML BAG 07983-09

## (undated) DEVICE — STPL ENDO TA30 2.5MM MULTIFIRE 010901

## (undated) DEVICE — SU SILK 0 TIE 6X30" A306H

## (undated) DEVICE — ENDO CANNULA 05MM VERSAONE UNIVERSAL UNVCA5STF

## (undated) DEVICE — SUCTION MANIFOLD DORNOCH ULTRA CART UL-CL500

## (undated) DEVICE — ENDO TROCAR 05MM VERSAONE BLADELESS W/STD FIX CAN NONB5STF

## (undated) DEVICE — GLOVE PROTEXIS W/NEU-THERA 8.0  2D73TE80

## (undated) DEVICE — WIPES FOLEY CARE SURESTEP PROVON DFC100

## (undated) DEVICE — ENDO TROCAR OPTICAL 12MM VERSAONE W/STD FIX CAN UNVCA12STF

## (undated) DEVICE — STPL ENDO RELOAD 45MM VASCULAR MEDIUM TAN EGIA45AVM

## (undated) DEVICE — PREP CHLORAPREP 26ML TINTED ORANGE  260815

## (undated) DEVICE — STPL ENDO HANDLE GIA ULTRA UNIVERSAL STD EGIAUSTND

## (undated) DEVICE — ANTIFOG SOLUTION W/FOAM PAD 31142527

## (undated) DEVICE — TUBING INSUFFLATION W/FILTER CPC TO LUER 620-030-301

## (undated) DEVICE — SUTURE BOOTS 051003PBX

## (undated) DEVICE — BAG SPECIMEN NYLON MEMORY WIRE 7.5"X9" SB1079

## (undated) DEVICE — ESU GROUND PAD ADULT W/CORD E7507

## (undated) DEVICE — STPL ENDO RELOAD TA 30X2.5MM 010911L

## (undated) DEVICE — SU SILK 2-0 TIE 12X30" A305H

## (undated) DEVICE — PROTECTOR ARM ONE-STEP TRENDELENBURG 40418

## (undated) DEVICE — SURGICEL HEMOSTAT 4X8" 1952

## (undated) DEVICE — SU VICRYL 3-0 SH 27" J316H

## (undated) DEVICE — SU PDS II 1 CTX 36" Z371T

## (undated) DEVICE — BLADE SWITCH SCISSORS TIP 5MM 89-5100B

## (undated) DEVICE — SU MONOCRYL 4-0 PS-2 27" UND Y426H

## (undated) DEVICE — LINEN TOWEL PACK X6 WHITE 5487

## (undated) DEVICE — STRAP UNIVERSAL POSITIONING 2-PIECE 4X47X76" 91-287

## (undated) DEVICE — DRAPE IOBAN INCISE 23X17" 6650EZ

## (undated) DEVICE — SU DERMABOND ADVANCED .7ML DNX12

## (undated) DEVICE — LINEN TOWEL PACK X30 5481

## (undated) DEVICE — ENDO TROCAR 12MM VERSAONE BLADELESS W/STD FIX CAN NONB12STF

## (undated) DEVICE — CLIP ENDO HEMO-LOC PURPLE LG 544240

## (undated) DEVICE — GLOVE PROTEXIS W/NEU-THERA 8.5  2D73TE85

## (undated) DEVICE — ESU GROUND PAD ADULT REM W/15' CORD E7507DB

## (undated) RX ORDER — GABAPENTIN 300 MG/1
CAPSULE ORAL
Status: DISPENSED
Start: 2017-06-21

## (undated) RX ORDER — FENTANYL CITRATE 50 UG/ML
INJECTION, SOLUTION INTRAMUSCULAR; INTRAVENOUS
Status: DISPENSED
Start: 2017-06-21

## (undated) RX ORDER — EPHEDRINE SULFATE 50 MG/ML
INJECTION, SOLUTION INTRAMUSCULAR; INTRAVENOUS; SUBCUTANEOUS
Status: DISPENSED
Start: 2017-06-21

## (undated) RX ORDER — PHENYLEPHRINE HCL IN 0.9% NACL 1 MG/10 ML
SYRINGE (ML) INTRAVENOUS
Status: DISPENSED
Start: 2017-06-21

## (undated) RX ORDER — SODIUM CHLORIDE 9 MG/ML
INJECTION, SOLUTION INTRAVENOUS
Status: DISPENSED
Start: 2017-06-21

## (undated) RX ORDER — ONDANSETRON 2 MG/ML
INJECTION INTRAMUSCULAR; INTRAVENOUS
Status: DISPENSED
Start: 2017-06-21

## (undated) RX ORDER — LIDOCAINE HYDROCHLORIDE 20 MG/ML
INJECTION, SOLUTION EPIDURAL; INFILTRATION; INTRACAUDAL; PERINEURAL
Status: DISPENSED
Start: 2017-06-21

## (undated) RX ORDER — DEXAMETHASONE SODIUM PHOSPHATE 4 MG/ML
INJECTION, SOLUTION INTRA-ARTICULAR; INTRALESIONAL; INTRAMUSCULAR; INTRAVENOUS; SOFT TISSUE
Status: DISPENSED
Start: 2017-06-21

## (undated) RX ORDER — HEPARIN SODIUM 5000 [USP'U]/.5ML
INJECTION, SOLUTION INTRAVENOUS; SUBCUTANEOUS
Status: DISPENSED
Start: 2017-06-21

## (undated) RX ORDER — ACETAMINOPHEN 325 MG/1
TABLET ORAL
Status: DISPENSED
Start: 2017-06-21

## (undated) RX ORDER — CEFAZOLIN SODIUM 2 G/100ML
INJECTION, SOLUTION INTRAVENOUS
Status: DISPENSED
Start: 2017-06-21

## (undated) RX ORDER — PROPOFOL 10 MG/ML
INJECTION, EMULSION INTRAVENOUS
Status: DISPENSED
Start: 2017-06-21